# Patient Record
Sex: FEMALE | Race: WHITE | NOT HISPANIC OR LATINO
[De-identification: names, ages, dates, MRNs, and addresses within clinical notes are randomized per-mention and may not be internally consistent; named-entity substitution may affect disease eponyms.]

---

## 2017-02-07 ENCOUNTER — APPOINTMENT (OUTPATIENT)
Dept: SURGICAL ONCOLOGY | Facility: CLINIC | Age: 57
End: 2017-02-07

## 2017-02-07 VITALS
DIASTOLIC BLOOD PRESSURE: 79 MMHG | SYSTOLIC BLOOD PRESSURE: 126 MMHG | WEIGHT: 145.2 LBS | HEIGHT: 70 IN | OXYGEN SATURATION: 99 % | BODY MASS INDEX: 20.79 KG/M2 | HEART RATE: 70 BPM

## 2017-02-14 ENCOUNTER — RESULT REVIEW (OUTPATIENT)
Age: 57
End: 2017-02-14

## 2017-02-15 ENCOUNTER — FORM ENCOUNTER (OUTPATIENT)
Age: 57
End: 2017-02-15

## 2017-02-16 ENCOUNTER — APPOINTMENT (OUTPATIENT)
Dept: ULTRASOUND IMAGING | Facility: CLINIC | Age: 57
End: 2017-02-16

## 2017-02-16 ENCOUNTER — APPOINTMENT (OUTPATIENT)
Dept: MAMMOGRAPHY | Facility: CLINIC | Age: 57
End: 2017-02-16

## 2017-02-16 ENCOUNTER — OUTPATIENT (OUTPATIENT)
Dept: OUTPATIENT SERVICES | Facility: HOSPITAL | Age: 57
LOS: 1 days | End: 2017-02-16
Payer: COMMERCIAL

## 2017-02-16 DIAGNOSIS — Z00.8 ENCOUNTER FOR OTHER GENERAL EXAMINATION: ICD-10-CM

## 2017-02-16 PROCEDURE — 76641 ULTRASOUND BREAST COMPLETE: CPT

## 2017-02-16 PROCEDURE — 77063 BREAST TOMOSYNTHESIS BI: CPT

## 2017-02-16 PROCEDURE — 77067 SCR MAMMO BI INCL CAD: CPT

## 2017-05-03 ENCOUNTER — EMERGENCY (EMERGENCY)
Facility: HOSPITAL | Age: 57
LOS: 1 days | Discharge: HOME | End: 2017-05-03
Attending: EMERGENCY MEDICINE | Admitting: EMERGENCY MEDICINE
Payer: COMMERCIAL

## 2017-05-03 VITALS
DIASTOLIC BLOOD PRESSURE: 72 MMHG | SYSTOLIC BLOOD PRESSURE: 114 MMHG | OXYGEN SATURATION: 98 % | RESPIRATION RATE: 16 BRPM | HEART RATE: 72 BPM | TEMPERATURE: 98 F

## 2017-05-03 VITALS
RESPIRATION RATE: 16 BRPM | HEART RATE: 77 BPM | WEIGHT: 143.96 LBS | OXYGEN SATURATION: 97 % | SYSTOLIC BLOOD PRESSURE: 101 MMHG | HEIGHT: 70 IN | DIASTOLIC BLOOD PRESSURE: 67 MMHG

## 2017-05-03 DIAGNOSIS — Z90.49 ACQUIRED ABSENCE OF OTHER SPECIFIED PARTS OF DIGESTIVE TRACT: Chronic | ICD-10-CM

## 2017-05-03 LAB
ANION GAP SERPL CALC-SCNC: 6 MMOL/L — SIGNIFICANT CHANGE UP (ref 5–17)
BASOPHILS # BLD AUTO: 0.1 K/UL — SIGNIFICANT CHANGE UP (ref 0–0.2)
BASOPHILS NFR BLD AUTO: 1.2 % — SIGNIFICANT CHANGE UP (ref 0–2)
BUN SERPL-MCNC: 23 MG/DL — SIGNIFICANT CHANGE UP (ref 7–23)
CALCIUM SERPL-MCNC: 9.4 MG/DL — SIGNIFICANT CHANGE UP (ref 8.4–10.5)
CHLORIDE SERPL-SCNC: 108 MMOL/L — SIGNIFICANT CHANGE UP (ref 96–108)
CO2 SERPL-SCNC: 31 MMOL/L — SIGNIFICANT CHANGE UP (ref 22–31)
CREAT SERPL-MCNC: 0.68 MG/DL — SIGNIFICANT CHANGE UP (ref 0.5–1.3)
EOSINOPHIL # BLD AUTO: 0.1 K/UL — SIGNIFICANT CHANGE UP (ref 0–0.5)
EOSINOPHIL NFR BLD AUTO: 2.9 % — SIGNIFICANT CHANGE UP (ref 0–6)
GLUCOSE SERPL-MCNC: 102 MG/DL — HIGH (ref 70–99)
HCT VFR BLD CALC: 41.6 % — SIGNIFICANT CHANGE UP (ref 34.5–45)
HGB BLD-MCNC: 14.1 G/DL — SIGNIFICANT CHANGE UP (ref 11.5–15.5)
LYMPHOCYTES # BLD AUTO: 1.2 K/UL — SIGNIFICANT CHANGE UP (ref 1–3.3)
LYMPHOCYTES # BLD AUTO: 25.4 % — SIGNIFICANT CHANGE UP (ref 13–44)
MAGNESIUM SERPL-MCNC: 2.2 MG/DL — SIGNIFICANT CHANGE UP (ref 1.6–2.6)
MCHC RBC-ENTMCNC: 32.1 PG — SIGNIFICANT CHANGE UP (ref 27–34)
MCHC RBC-ENTMCNC: 33.8 GM/DL — SIGNIFICANT CHANGE UP (ref 32–36)
MCV RBC AUTO: 94.9 FL — SIGNIFICANT CHANGE UP (ref 80–100)
MONOCYTES # BLD AUTO: 0.4 K/UL — SIGNIFICANT CHANGE UP (ref 0–0.9)
MONOCYTES NFR BLD AUTO: 9.7 % — SIGNIFICANT CHANGE UP (ref 2–14)
NEUTROPHILS # BLD AUTO: 2.8 K/UL — SIGNIFICANT CHANGE UP (ref 1.8–7.4)
NEUTROPHILS NFR BLD AUTO: 60.8 % — SIGNIFICANT CHANGE UP (ref 43–77)
PLATELET # BLD AUTO: 279 K/UL — SIGNIFICANT CHANGE UP (ref 150–400)
POTASSIUM SERPL-MCNC: 4.3 MMOL/L — SIGNIFICANT CHANGE UP (ref 3.5–5.3)
POTASSIUM SERPL-SCNC: 4.3 MMOL/L — SIGNIFICANT CHANGE UP (ref 3.5–5.3)
RBC # BLD: 4.38 M/UL — SIGNIFICANT CHANGE UP (ref 3.8–5.2)
RBC # FLD: 14.2 % — SIGNIFICANT CHANGE UP (ref 10.3–14.5)
SODIUM SERPL-SCNC: 145 MMOL/L — SIGNIFICANT CHANGE UP (ref 135–145)
WBC # BLD: 4.6 K/UL — SIGNIFICANT CHANGE UP (ref 3.8–10.5)
WBC # FLD AUTO: 4.6 K/UL — SIGNIFICANT CHANGE UP (ref 3.8–10.5)

## 2017-05-03 PROCEDURE — 93005 ELECTROCARDIOGRAM TRACING: CPT

## 2017-05-03 PROCEDURE — 93010 ELECTROCARDIOGRAM REPORT: CPT

## 2017-05-03 PROCEDURE — 99284 EMERGENCY DEPT VISIT MOD MDM: CPT

## 2017-05-03 PROCEDURE — 85027 COMPLETE CBC AUTOMATED: CPT

## 2017-05-03 PROCEDURE — 99283 EMERGENCY DEPT VISIT LOW MDM: CPT | Mod: 25

## 2017-05-03 PROCEDURE — 80048 BASIC METABOLIC PNL TOTAL CA: CPT

## 2017-05-03 PROCEDURE — 83735 ASSAY OF MAGNESIUM: CPT

## 2017-05-03 RX ORDER — SODIUM CHLORIDE 9 MG/ML
1000 INJECTION INTRAMUSCULAR; INTRAVENOUS; SUBCUTANEOUS ONCE
Qty: 0 | Refills: 0 | Status: COMPLETED | OUTPATIENT
Start: 2017-05-03 | End: 2017-05-03

## 2017-05-03 RX ORDER — THYROID 120 MG
1 TABLET ORAL
Qty: 0 | Refills: 0 | COMMUNITY

## 2017-05-03 RX ADMIN — SODIUM CHLORIDE 1000 MILLILITER(S): 9 INJECTION INTRAMUSCULAR; INTRAVENOUS; SUBCUTANEOUS at 08:08

## 2017-05-03 NOTE — ED ADULT NURSE NOTE - CHPI ED SYMPTOMS NEG
no dizziness/no numbness/no fever/no tingling/no pain/no vomiting/no decreased eating/drinking/no chills/no weakness/no nausea

## 2017-05-03 NOTE — ED PROVIDER NOTE - OBJECTIVE STATEMENT
57 y.o. F says awoke about 30min ago, heart racing, ears hot - dropped a glass of water - went to bathroom had loose stool, no nausea - just felt like needed to lay down - felt dizzy like might fall - unsteady - now symptoms seem to be passing - ears feel warm - yesterday was fine - worked out at gym - not extremely strenous - good PO intake yesterday - has had rhinorrhea few weeks with change in season - wanted to come in and get checked out. Did smoke cigarettes x4 last night - is not a regular smoker - 4 drinks as well - drinks about 7glasses a week usually - not every day - was helping girlfriend deal with stress - pt denies h/o stress/depression 57 y.o. F says awoke about 30min ago, heart racing, ears hot - dropped a glass of water - went to bathroom had loose stool, no nausea - just felt like needed to lay down - felt dizzy like might fall - unsteady - now symptoms seem to be passing - ears feel warm - yesterday was fine - worked out at gym - not extremely strenous - good PO intake yesterday - has had rhinorrhea few weeks with change in season - wanted to come in and get checked out. Did smoke cigarettes x4 last night - is not a regular smoker - 4 drinks as well - drinks about 7glasses a week usually - not every day - was helping girlfriend deal with stress - pt denies h/o stress/depression, though did have stressful issues last week relating to long time friend

## 2017-05-03 NOTE — ED ADULT NURSE REASSESSMENT NOTE - NS ED NURSE REASSESS COMMENT FT1
Pt resting comfortably on stretcher. Family bedside. Color good. Skin warm & dry to touch. Lungs clear. IV patent & fusing well. Site in RT arm without signs of infiltrate.

## 2017-05-03 NOTE — ED PROVIDER NOTE - MEDICAL DECISION MAKING DETAILS
57 y.o. F presenting with acute episode unsteadiness/lightheadedness - now resolving - labs/ekg - re-eval

## 2017-08-02 ENCOUNTER — APPOINTMENT (OUTPATIENT)
Dept: ULTRASOUND IMAGING | Facility: CLINIC | Age: 57
End: 2017-08-02
Payer: COMMERCIAL

## 2017-08-02 ENCOUNTER — OUTPATIENT (OUTPATIENT)
Dept: OUTPATIENT SERVICES | Facility: HOSPITAL | Age: 57
LOS: 1 days | End: 2017-08-02
Payer: COMMERCIAL

## 2017-08-02 DIAGNOSIS — Z90.49 ACQUIRED ABSENCE OF OTHER SPECIFIED PARTS OF DIGESTIVE TRACT: Chronic | ICD-10-CM

## 2017-08-02 DIAGNOSIS — Z00.8 ENCOUNTER FOR OTHER GENERAL EXAMINATION: ICD-10-CM

## 2017-08-02 PROCEDURE — 76881 US COMPL JOINT R-T W/IMG: CPT | Mod: 26

## 2017-08-02 PROCEDURE — 76881 US COMPL JOINT R-T W/IMG: CPT

## 2018-02-08 ENCOUNTER — APPOINTMENT (OUTPATIENT)
Dept: SURGICAL ONCOLOGY | Facility: CLINIC | Age: 58
End: 2018-02-08
Payer: COMMERCIAL

## 2018-02-08 VITALS
DIASTOLIC BLOOD PRESSURE: 73 MMHG | HEART RATE: 63 BPM | SYSTOLIC BLOOD PRESSURE: 124 MMHG | WEIGHT: 145 LBS | BODY MASS INDEX: 20.76 KG/M2 | OXYGEN SATURATION: 98 % | HEIGHT: 70 IN

## 2018-02-08 PROCEDURE — 99214 OFFICE O/P EST MOD 30 MIN: CPT

## 2018-02-20 ENCOUNTER — FORM ENCOUNTER (OUTPATIENT)
Age: 58
End: 2018-02-20

## 2018-02-21 ENCOUNTER — APPOINTMENT (OUTPATIENT)
Dept: MAMMOGRAPHY | Facility: CLINIC | Age: 58
End: 2018-02-21
Payer: COMMERCIAL

## 2018-02-21 ENCOUNTER — APPOINTMENT (OUTPATIENT)
Dept: ULTRASOUND IMAGING | Facility: CLINIC | Age: 58
End: 2018-02-21
Payer: COMMERCIAL

## 2018-02-21 ENCOUNTER — OUTPATIENT (OUTPATIENT)
Dept: OUTPATIENT SERVICES | Facility: HOSPITAL | Age: 58
LOS: 1 days | End: 2018-02-21
Payer: COMMERCIAL

## 2018-02-21 DIAGNOSIS — Z00.8 ENCOUNTER FOR OTHER GENERAL EXAMINATION: ICD-10-CM

## 2018-02-21 DIAGNOSIS — Z90.49 ACQUIRED ABSENCE OF OTHER SPECIFIED PARTS OF DIGESTIVE TRACT: Chronic | ICD-10-CM

## 2018-02-21 PROCEDURE — 77067 SCR MAMMO BI INCL CAD: CPT | Mod: 26

## 2018-02-21 PROCEDURE — 77063 BREAST TOMOSYNTHESIS BI: CPT

## 2018-02-21 PROCEDURE — 76641 ULTRASOUND BREAST COMPLETE: CPT

## 2018-02-21 PROCEDURE — 77067 SCR MAMMO BI INCL CAD: CPT

## 2018-02-21 PROCEDURE — 76641 ULTRASOUND BREAST COMPLETE: CPT | Mod: 26,50

## 2018-02-21 PROCEDURE — 77063 BREAST TOMOSYNTHESIS BI: CPT | Mod: 26

## 2018-03-25 ENCOUNTER — RESULT REVIEW (OUTPATIENT)
Age: 58
End: 2018-03-25

## 2018-04-17 ENCOUNTER — APPOINTMENT (OUTPATIENT)
Dept: SURGERY | Facility: CLINIC | Age: 58
End: 2018-04-17
Payer: COMMERCIAL

## 2018-04-17 VITALS
HEART RATE: 67 BPM | DIASTOLIC BLOOD PRESSURE: 87 MMHG | RESPIRATION RATE: 15 BRPM | BODY MASS INDEX: 20.76 KG/M2 | TEMPERATURE: 97.7 F | WEIGHT: 145 LBS | HEIGHT: 70 IN | OXYGEN SATURATION: 100 % | SYSTOLIC BLOOD PRESSURE: 152 MMHG

## 2018-04-17 DIAGNOSIS — K62.89 OTHER SPECIFIED DISEASES OF ANUS AND RECTUM: ICD-10-CM

## 2018-04-17 PROCEDURE — 46221 LIGATION OF HEMORRHOID(S): CPT

## 2018-04-17 PROCEDURE — 99213 OFFICE O/P EST LOW 20 MIN: CPT | Mod: 25

## 2018-09-05 ENCOUNTER — EMERGENCY (EMERGENCY)
Facility: HOSPITAL | Age: 58
LOS: 1 days | Discharge: HOME | End: 2018-09-05
Attending: EMERGENCY MEDICINE
Payer: COMMERCIAL

## 2018-09-05 VITALS
HEART RATE: 68 BPM | TEMPERATURE: 98 F | DIASTOLIC BLOOD PRESSURE: 92 MMHG | WEIGHT: 145.06 LBS | OXYGEN SATURATION: 96 % | RESPIRATION RATE: 16 BRPM | HEIGHT: 70 IN | SYSTOLIC BLOOD PRESSURE: 147 MMHG

## 2018-09-05 VITALS
RESPIRATION RATE: 18 BRPM | OXYGEN SATURATION: 99 % | SYSTOLIC BLOOD PRESSURE: 145 MMHG | HEART RATE: 67 BPM | DIASTOLIC BLOOD PRESSURE: 84 MMHG

## 2018-09-05 DIAGNOSIS — Z90.49 ACQUIRED ABSENCE OF OTHER SPECIFIED PARTS OF DIGESTIVE TRACT: Chronic | ICD-10-CM

## 2018-09-05 PROBLEM — K57.20 DIVERTICULITIS OF LARGE INTESTINE WITH PERFORATION AND ABSCESS WITHOUT BLEEDING: Chronic | Status: ACTIVE | Noted: 2017-05-03

## 2018-09-05 LAB
ALBUMIN SERPL ELPH-MCNC: 4.5 G/DL — SIGNIFICANT CHANGE UP (ref 3.3–5)
ALP SERPL-CCNC: 78 U/L — SIGNIFICANT CHANGE UP (ref 40–120)
ALT FLD-CCNC: 16 U/L — SIGNIFICANT CHANGE UP (ref 10–45)
ANION GAP SERPL CALC-SCNC: 10 MMOL/L — SIGNIFICANT CHANGE UP (ref 5–17)
AST SERPL-CCNC: 18 U/L — SIGNIFICANT CHANGE UP (ref 10–40)
BASOPHILS # BLD AUTO: 0.1 K/UL — SIGNIFICANT CHANGE UP (ref 0–0.2)
BASOPHILS NFR BLD AUTO: 1.1 % — SIGNIFICANT CHANGE UP (ref 0–2)
BILIRUB SERPL-MCNC: 0.3 MG/DL — SIGNIFICANT CHANGE UP (ref 0.2–1.2)
BUN SERPL-MCNC: 13 MG/DL — SIGNIFICANT CHANGE UP (ref 7–23)
CALCIUM SERPL-MCNC: 9.9 MG/DL — SIGNIFICANT CHANGE UP (ref 8.4–10.5)
CHLORIDE SERPL-SCNC: 104 MMOL/L — SIGNIFICANT CHANGE UP (ref 96–108)
CO2 SERPL-SCNC: 26 MMOL/L — SIGNIFICANT CHANGE UP (ref 22–31)
CREAT SERPL-MCNC: 0.82 MG/DL — SIGNIFICANT CHANGE UP (ref 0.5–1.3)
EOSINOPHIL # BLD AUTO: 0.2 K/UL — SIGNIFICANT CHANGE UP (ref 0–0.5)
EOSINOPHIL NFR BLD AUTO: 3.1 % — SIGNIFICANT CHANGE UP (ref 0–6)
GLUCOSE SERPL-MCNC: 101 MG/DL — HIGH (ref 70–99)
HCT VFR BLD CALC: 41 % — SIGNIFICANT CHANGE UP (ref 34.5–45)
HGB BLD-MCNC: 13.6 G/DL — SIGNIFICANT CHANGE UP (ref 11.5–15.5)
LYMPHOCYTES # BLD AUTO: 1.7 K/UL — SIGNIFICANT CHANGE UP (ref 1–3.3)
LYMPHOCYTES # BLD AUTO: 28.8 % — SIGNIFICANT CHANGE UP (ref 13–44)
MCHC RBC-ENTMCNC: 30.6 PG — SIGNIFICANT CHANGE UP (ref 27–34)
MCHC RBC-ENTMCNC: 33.1 GM/DL — SIGNIFICANT CHANGE UP (ref 32–36)
MCV RBC AUTO: 92.5 FL — SIGNIFICANT CHANGE UP (ref 80–100)
MONOCYTES # BLD AUTO: 0.5 K/UL — SIGNIFICANT CHANGE UP (ref 0–0.9)
MONOCYTES NFR BLD AUTO: 8.3 % — SIGNIFICANT CHANGE UP (ref 2–14)
NEUTROPHILS # BLD AUTO: 3.4 K/UL — SIGNIFICANT CHANGE UP (ref 1.8–7.4)
NEUTROPHILS NFR BLD AUTO: 58.8 % — SIGNIFICANT CHANGE UP (ref 43–77)
PLATELET # BLD AUTO: 288 K/UL — SIGNIFICANT CHANGE UP (ref 150–400)
POTASSIUM SERPL-MCNC: 3.9 MMOL/L — SIGNIFICANT CHANGE UP (ref 3.5–5.3)
POTASSIUM SERPL-SCNC: 3.9 MMOL/L — SIGNIFICANT CHANGE UP (ref 3.5–5.3)
PROT SERPL-MCNC: 6.7 G/DL — SIGNIFICANT CHANGE UP (ref 6–8.3)
RBC # BLD: 4.43 M/UL — SIGNIFICANT CHANGE UP (ref 3.8–5.2)
RBC # FLD: 13 % — SIGNIFICANT CHANGE UP (ref 10.3–14.5)
SODIUM SERPL-SCNC: 140 MMOL/L — SIGNIFICANT CHANGE UP (ref 135–145)
TROPONIN T, HIGH SENSITIVITY RESULT: <6 NG/L — SIGNIFICANT CHANGE UP (ref 0–51)
WBC # BLD: 5.8 K/UL — SIGNIFICANT CHANGE UP (ref 3.8–10.5)
WBC # FLD AUTO: 5.8 K/UL — SIGNIFICANT CHANGE UP (ref 3.8–10.5)

## 2018-09-05 PROCEDURE — 93010 ELECTROCARDIOGRAM REPORT: CPT

## 2018-09-05 PROCEDURE — 93005 ELECTROCARDIOGRAM TRACING: CPT

## 2018-09-05 PROCEDURE — 84484 ASSAY OF TROPONIN QUANT: CPT

## 2018-09-05 PROCEDURE — 99284 EMERGENCY DEPT VISIT MOD MDM: CPT | Mod: 25

## 2018-09-05 PROCEDURE — 80053 COMPREHEN METABOLIC PANEL: CPT

## 2018-09-05 PROCEDURE — 99285 EMERGENCY DEPT VISIT HI MDM: CPT | Mod: 25

## 2018-09-05 PROCEDURE — 85027 COMPLETE CBC AUTOMATED: CPT

## 2018-09-05 PROCEDURE — 71045 X-RAY EXAM CHEST 1 VIEW: CPT

## 2018-09-05 PROCEDURE — 84443 ASSAY THYROID STIM HORMONE: CPT

## 2018-09-05 PROCEDURE — 99053 MED SERV 10PM-8AM 24 HR FAC: CPT

## 2018-09-05 PROCEDURE — 71045 X-RAY EXAM CHEST 1 VIEW: CPT | Mod: 26

## 2018-09-05 NOTE — ED POST DISCHARGE NOTE - DETAILS
9/5/18: Spoke with patient and made aware of elevated TSH. She has hx of hypothyroidism. Informed me she will f/u with her PMD regarding the results. - Felton Banks PA-C

## 2018-09-05 NOTE — ED ADULT NURSE NOTE - OBJECTIVE STATEMENT
57 y/o female presents to ED c/o chest discomfort that started tonight at rest. She reports taking checking her HR which was 53. Denies chest pain, SOB, palpitations. She says discomfort feels like a "mild pressure" with no radiation or diaphoresis. Pt appears anxious. NSR on cardiac monitor. Gross motor and neuro intact. 20G IV placed in RAC. Safety and comfort provided.

## 2018-09-05 NOTE — ED PROVIDER NOTE - MEDICAL DECISION MAKING DETAILS
EKG, CXR, labs including troponin EKG, CXR, labs including troponin; Not suspecting PE clinically based on vitals and history and physical exam.

## 2018-09-05 NOTE — ED ADULT NURSE NOTE - CHPI ED NUR SYMPTOMS NEG
no shortness of breath/no back pain/no chills/no dizziness/no chest pain/no fever/no nausea/no syncope/no diaphoresis/no congestion/no vomiting

## 2018-09-05 NOTE — ED PROVIDER NOTE - OBJECTIVE STATEMENT
59 yo female with PMH of hypothyroidism 57 yo female with PMH of hypothyroidism presents to the ED for chest discomfort. Pt states she was watching TV tonight when she developed vague chest discomfort that was mid-sternal, non-radiating, no SOB. Patient appears NAD in the ED. No aspirin tonight, not on AC. Denies extremity pain or swelling, prolonged immobility, recent surgery, h/o PE or DVT. Non-smoker. 59 yo female with PMH of hypothyroidism presents to the ED for chest discomfort. Pt states she was watching TV tonight when she developed vague chest discomfort that was mid-sternal, non-radiating, no SOB. Patient appears NAD in the ED. No aspirin tonight, not on AC. Takes hormone therapy for menopause. Denies extremity pain or swelling, prolonged immobility, recent surgery, h/o PE or DVT. Intermittent social smoker.

## 2018-09-05 NOTE — ED PROVIDER NOTE - ATTENDING CONTRIBUTION TO CARE
attending Katelyn: 58yF h/o hypothyroidism, everyday exercise at gym presents after episode of chest discomfort while watching TV. Mid-sternal, non-radiating, no SOB. No active CP in ED. Intermittent social tobacco. Will obtain ekg, place on tele, labs including troponin, cxr and reassess

## 2019-02-11 ENCOUNTER — APPOINTMENT (OUTPATIENT)
Dept: SURGICAL ONCOLOGY | Facility: CLINIC | Age: 59
End: 2019-02-11

## 2019-03-05 ENCOUNTER — APPOINTMENT (OUTPATIENT)
Dept: SURGERY | Facility: CLINIC | Age: 59
End: 2019-03-05
Payer: COMMERCIAL

## 2019-03-05 VITALS
OXYGEN SATURATION: 98 % | DIASTOLIC BLOOD PRESSURE: 84 MMHG | HEIGHT: 70 IN | SYSTOLIC BLOOD PRESSURE: 154 MMHG | WEIGHT: 142 LBS | HEART RATE: 78 BPM | TEMPERATURE: 97.5 F | BODY MASS INDEX: 20.33 KG/M2 | RESPIRATION RATE: 16 BRPM

## 2019-03-05 RX ORDER — LEVOTHYROXINE SODIUM 0.1 MG/1
100 TABLET ORAL
Qty: 30 | Refills: 0 | Status: ACTIVE | COMMUNITY
Start: 2019-02-25

## 2019-03-05 RX ORDER — HYDROCORTISONE ACETATE 25 MG/1
25 SUPPOSITORY RECTAL
Qty: 12 | Refills: 0 | Status: ACTIVE | COMMUNITY
Start: 2019-02-26

## 2019-03-05 RX ORDER — LEVOTHYROXINE, LIOTHYRONINE 57; 13.5 UG/1; UG/1
90 TABLET ORAL
Qty: 30 | Refills: 0 | Status: DISCONTINUED | COMMUNITY
Start: 2018-11-14

## 2019-03-05 RX ORDER — FLUTICASONE PROPIONATE AND SALMETEROL 50; 250 UG/1; UG/1
250-50 POWDER RESPIRATORY (INHALATION)
Qty: 60 | Refills: 0 | Status: DISCONTINUED | COMMUNITY
Start: 2017-11-07 | End: 2019-03-05

## 2019-03-06 ENCOUNTER — TRANSCRIPTION ENCOUNTER (OUTPATIENT)
Age: 59
End: 2019-03-06

## 2019-03-07 ENCOUNTER — APPOINTMENT (OUTPATIENT)
Dept: SURGICAL ONCOLOGY | Facility: CLINIC | Age: 59
End: 2019-03-07
Payer: COMMERCIAL

## 2019-03-07 VITALS
OXYGEN SATURATION: 100 % | DIASTOLIC BLOOD PRESSURE: 77 MMHG | BODY MASS INDEX: 20.04 KG/M2 | HEIGHT: 70 IN | HEART RATE: 72 BPM | SYSTOLIC BLOOD PRESSURE: 134 MMHG | WEIGHT: 140 LBS

## 2019-03-07 PROCEDURE — 99214 OFFICE O/P EST MOD 30 MIN: CPT

## 2019-03-08 NOTE — ASSESSMENT
[FreeTextEntry1] : 59 year old woman here for annual breast exam with previous BIRADS2 imaging. Her breasts are rather dense, however I do not palpate any concerning nodules and her imaging has been consistent. I ordered her for a mammo-sono in the coming weeks to compare to priors. If no concerning findings arise I believe she can continue with this regimen, and told her we can obtain an MRI if her imaging does not demonstrate consistent findings.

## 2019-03-08 NOTE — PHYSICAL EXAM
[Normal] : supple, no neck mass and thyroid not enlarged [Normal Neck Lymph Nodes] : normal neck lymph nodes  [Normal Supraclavicular Lymph Nodes] : normal supraclavicular lymph nodes [Normal Groin Lymph Nodes] : normal groin lymph nodes [Normal Axillary Lymph Nodes] : normal axillary lymph nodes [Normal] : oriented to person, place and time, with appropriate affect [de-identified] : the breasts are symmetric. There are no areas of skin thickening or tethering, nipple inversion/scaling/discharge, or dominant masses in either breast. Both breasts are characterized by very dense fibrous tissue quality

## 2019-03-08 NOTE — HISTORY OF PRESENT ILLNESS
[de-identified] : Ms. Friedman is a 59 year old healthy woman who is formerly a patient of Dr. Lezama. She comes for yearly breast imaging and clinical exam. Her last mammo-sono was done in Feb, 2018 and demonstrated BIRADS2 findings. She denies any new breast lesions, nipple changes, or changes in the skin or contour of her breasts. She has always been told she has very dense breasts and may need an MRI, and presents to discuss her best surveillance options going forward.

## 2019-03-26 ENCOUNTER — APPOINTMENT (OUTPATIENT)
Dept: ULTRASOUND IMAGING | Facility: CLINIC | Age: 59
End: 2019-03-26
Payer: COMMERCIAL

## 2019-03-26 ENCOUNTER — OUTPATIENT (OUTPATIENT)
Dept: OUTPATIENT SERVICES | Facility: HOSPITAL | Age: 59
LOS: 1 days | End: 2019-03-26
Payer: COMMERCIAL

## 2019-03-26 ENCOUNTER — APPOINTMENT (OUTPATIENT)
Dept: MAMMOGRAPHY | Facility: CLINIC | Age: 59
End: 2019-03-26
Payer: COMMERCIAL

## 2019-03-26 DIAGNOSIS — Z00.8 ENCOUNTER FOR OTHER GENERAL EXAMINATION: ICD-10-CM

## 2019-03-26 DIAGNOSIS — Z90.49 ACQUIRED ABSENCE OF OTHER SPECIFIED PARTS OF DIGESTIVE TRACT: Chronic | ICD-10-CM

## 2019-03-26 PROCEDURE — 76641 ULTRASOUND BREAST COMPLETE: CPT

## 2019-03-26 PROCEDURE — 77067 SCR MAMMO BI INCL CAD: CPT

## 2019-03-26 PROCEDURE — 77063 BREAST TOMOSYNTHESIS BI: CPT | Mod: 26

## 2019-03-26 PROCEDURE — 77067 SCR MAMMO BI INCL CAD: CPT | Mod: 26

## 2019-03-26 PROCEDURE — 76641 ULTRASOUND BREAST COMPLETE: CPT | Mod: 26,50

## 2019-03-26 PROCEDURE — 77063 BREAST TOMOSYNTHESIS BI: CPT

## 2019-03-28 DIAGNOSIS — K64.9 UNSPECIFIED HEMORRHOIDS: ICD-10-CM

## 2019-03-28 PROCEDURE — 46600 DIAGNOSTIC ANOSCOPY SPX: CPT

## 2019-03-28 PROCEDURE — 99213 OFFICE O/P EST LOW 20 MIN: CPT | Mod: 25

## 2019-09-17 ENCOUNTER — TRANSCRIPTION ENCOUNTER (OUTPATIENT)
Age: 59
End: 2019-09-17

## 2020-03-12 ENCOUNTER — APPOINTMENT (OUTPATIENT)
Dept: SURGICAL ONCOLOGY | Facility: CLINIC | Age: 60
End: 2020-03-12
Payer: COMMERCIAL

## 2020-03-12 VITALS
HEIGHT: 70 IN | DIASTOLIC BLOOD PRESSURE: 86 MMHG | BODY MASS INDEX: 20.41 KG/M2 | OXYGEN SATURATION: 97 % | WEIGHT: 142.6 LBS | TEMPERATURE: 98.2 F | HEART RATE: 62 BPM | SYSTOLIC BLOOD PRESSURE: 146 MMHG | RESPIRATION RATE: 16 BRPM

## 2020-03-12 PROCEDURE — 99214 OFFICE O/P EST MOD 30 MIN: CPT

## 2020-03-18 NOTE — PHYSICAL EXAM
[Normal] : supple, no neck mass and thyroid not enlarged [Normal Neck Lymph Nodes] : normal neck lymph nodes  [Normal Supraclavicular Lymph Nodes] : normal supraclavicular lymph nodes [Normal Groin Lymph Nodes] : normal groin lymph nodes [Normal Axillary Lymph Nodes] : normal axillary lymph nodes [Normal] : oriented to person, place and time, with appropriate affect [de-identified] : 1.5 CM round mobile firm tender nodule on left upper inner quadrant breast - 11 o'clock 3.0 CM from the nipple

## 2020-03-18 NOTE — ASSESSMENT
[FreeTextEntry1] : 60 year old woman here for annual breast exam with previous BIRADS2 imaging. There is a 1.5 CM round mobile firm tender nodule on left upper inner quadrant breast - 11 o'clock 3.0 CM from the nipple. I ordered her for a mammo-sono for now and 1 year follow up if no concerning imaging findings found. If no concerning findings arise I believe she can continue with this regimen.\par \par \par Plan:\par - Mammo/sono now - will recommend evening primrose oil if exam is unremarkable\par - 1 year follow up if no concerning imaging found, earlier if mammo/sono with concerning findings.

## 2020-03-18 NOTE — HISTORY OF PRESENT ILLNESS
[de-identified] : Ms Daiana Friedman is a 61 y/o female presents for follow-up visit for breast evaluation. Pt reports having left breast pain. Denies nipple discharge, changes in skin or contour. States she has been on bioidentical hormones since 2010 but denies starting new medications or hormones. \par \par US breast complete BL - 3/26/2019 - impression: no suspicious mass, suspicious microcalcifications, or other sign of malignancy is identified. There is no underlying diffuse marked vague nodularity as in the past...scattered benign type calcifications are noted\par MG Mammo Screen w JOSE BI# - 3/26/2019 - impression: no mammographic evidence of malignancy\par \par INTERIM: Ms. Friedman is a 59 year old healthy woman who is formerly a patient of Dr. Lezama. She comes for yearly breast imaging and clinical exam. Her last mammo-sono was done in Feb, 2018 and demonstrated BIRADS2 findings. She denies any new breast lesions, nipple changes, or changes in the skin or contour of her breasts. She has always been told she has very dense breasts and may need an MRI, and presents to discuss her best surveillance options going forward.

## 2020-05-12 ENCOUNTER — RESULT REVIEW (OUTPATIENT)
Age: 60
End: 2020-05-12

## 2020-05-12 ENCOUNTER — APPOINTMENT (OUTPATIENT)
Dept: ULTRASOUND IMAGING | Facility: CLINIC | Age: 60
End: 2020-05-12
Payer: COMMERCIAL

## 2020-05-12 ENCOUNTER — OUTPATIENT (OUTPATIENT)
Dept: OUTPATIENT SERVICES | Facility: HOSPITAL | Age: 60
LOS: 1 days | End: 2020-05-12
Payer: COMMERCIAL

## 2020-05-12 ENCOUNTER — APPOINTMENT (OUTPATIENT)
Dept: MAMMOGRAPHY | Facility: CLINIC | Age: 60
End: 2020-05-12
Payer: COMMERCIAL

## 2020-05-12 DIAGNOSIS — Z90.49 ACQUIRED ABSENCE OF OTHER SPECIFIED PARTS OF DIGESTIVE TRACT: Chronic | ICD-10-CM

## 2020-05-12 DIAGNOSIS — Z12.31 ENCOUNTER FOR SCREENING MAMMOGRAM FOR MALIGNANT NEOPLASM OF BREAST: ICD-10-CM

## 2020-05-12 PROCEDURE — 77066 DX MAMMO INCL CAD BI: CPT

## 2020-05-12 PROCEDURE — 76641 ULTRASOUND BREAST COMPLETE: CPT

## 2020-05-12 PROCEDURE — G0279: CPT | Mod: 26

## 2020-05-12 PROCEDURE — 76641 ULTRASOUND BREAST COMPLETE: CPT | Mod: 26,50

## 2020-05-12 PROCEDURE — 77066 DX MAMMO INCL CAD BI: CPT | Mod: 26

## 2020-05-12 PROCEDURE — G0279: CPT

## 2020-06-01 NOTE — ED ADULT NURSE NOTE - NSFALLRSKASSESSTYPE_ED_ALL_ED
~1st attempt: Called pt, no answer, left vm and asked to call back to resched appt as PCP will be out of the office.      Initial (On Arrival)

## 2020-12-31 ENCOUNTER — EMERGENCY (EMERGENCY)
Facility: HOSPITAL | Age: 60
LOS: 0 days | Discharge: HOME | End: 2020-12-31
Attending: EMERGENCY MEDICINE
Payer: COMMERCIAL

## 2020-12-31 VITALS
DIASTOLIC BLOOD PRESSURE: 65 MMHG | SYSTOLIC BLOOD PRESSURE: 136 MMHG | TEMPERATURE: 98 F | OXYGEN SATURATION: 100 % | RESPIRATION RATE: 18 BRPM | HEART RATE: 65 BPM

## 2020-12-31 VITALS — WEIGHT: 143.08 LBS | HEIGHT: 70 IN

## 2020-12-31 DIAGNOSIS — E03.9 HYPOTHYROIDISM, UNSPECIFIED: ICD-10-CM

## 2020-12-31 DIAGNOSIS — Z90.49 ACQUIRED ABSENCE OF OTHER SPECIFIED PARTS OF DIGESTIVE TRACT: Chronic | ICD-10-CM

## 2020-12-31 DIAGNOSIS — R07.89 OTHER CHEST PAIN: ICD-10-CM

## 2020-12-31 DIAGNOSIS — R00.2 PALPITATIONS: ICD-10-CM

## 2020-12-31 DIAGNOSIS — Z85.89 PERSONAL HISTORY OF MALIGNANT NEOPLASM OF OTHER ORGANS AND SYSTEMS: ICD-10-CM

## 2020-12-31 LAB
ALBUMIN SERPL ELPH-MCNC: 3.5 G/DL — SIGNIFICANT CHANGE UP (ref 3.3–5)
ALP SERPL-CCNC: 80 U/L — SIGNIFICANT CHANGE UP (ref 40–120)
ALT FLD-CCNC: 21 U/L — SIGNIFICANT CHANGE UP (ref 12–78)
ANION GAP SERPL CALC-SCNC: 2 MMOL/L — LOW (ref 5–17)
AST SERPL-CCNC: 12 U/L — LOW (ref 15–37)
BASOPHILS # BLD AUTO: 0.04 K/UL — SIGNIFICANT CHANGE UP (ref 0–0.2)
BASOPHILS NFR BLD AUTO: 0.8 % — SIGNIFICANT CHANGE UP (ref 0–2)
BILIRUB SERPL-MCNC: 0.4 MG/DL — SIGNIFICANT CHANGE UP (ref 0.2–1.2)
BUN SERPL-MCNC: 19 MG/DL — SIGNIFICANT CHANGE UP (ref 7–23)
CALCIUM SERPL-MCNC: 9.1 MG/DL — SIGNIFICANT CHANGE UP (ref 8.5–10.1)
CHLORIDE SERPL-SCNC: 109 MMOL/L — HIGH (ref 96–108)
CO2 SERPL-SCNC: 28 MMOL/L — SIGNIFICANT CHANGE UP (ref 22–31)
CREAT SERPL-MCNC: 0.62 MG/DL — SIGNIFICANT CHANGE UP (ref 0.5–1.3)
EOSINOPHIL # BLD AUTO: 0.18 K/UL — SIGNIFICANT CHANGE UP (ref 0–0.5)
EOSINOPHIL NFR BLD AUTO: 3.5 % — SIGNIFICANT CHANGE UP (ref 0–6)
GLUCOSE SERPL-MCNC: 91 MG/DL — SIGNIFICANT CHANGE UP (ref 70–99)
HCT VFR BLD CALC: 40.7 % — SIGNIFICANT CHANGE UP (ref 34.5–45)
HGB BLD-MCNC: 13.1 G/DL — SIGNIFICANT CHANGE UP (ref 11.5–15.5)
IMM GRANULOCYTES NFR BLD AUTO: 0.2 % — SIGNIFICANT CHANGE UP (ref 0–1.5)
LYMPHOCYTES # BLD AUTO: 1.57 K/UL — SIGNIFICANT CHANGE UP (ref 1–3.3)
LYMPHOCYTES # BLD AUTO: 30.3 % — SIGNIFICANT CHANGE UP (ref 13–44)
MCHC RBC-ENTMCNC: 30.3 PG — SIGNIFICANT CHANGE UP (ref 27–34)
MCHC RBC-ENTMCNC: 32.2 GM/DL — SIGNIFICANT CHANGE UP (ref 32–36)
MCV RBC AUTO: 94.2 FL — SIGNIFICANT CHANGE UP (ref 80–100)
MONOCYTES # BLD AUTO: 0.56 K/UL — SIGNIFICANT CHANGE UP (ref 0–0.9)
MONOCYTES NFR BLD AUTO: 10.8 % — SIGNIFICANT CHANGE UP (ref 2–14)
NEUTROPHILS # BLD AUTO: 2.82 K/UL — SIGNIFICANT CHANGE UP (ref 1.8–7.4)
NEUTROPHILS NFR BLD AUTO: 54.4 % — SIGNIFICANT CHANGE UP (ref 43–77)
PLATELET # BLD AUTO: 257 K/UL — SIGNIFICANT CHANGE UP (ref 150–400)
POTASSIUM SERPL-MCNC: 3.8 MMOL/L — SIGNIFICANT CHANGE UP (ref 3.5–5.3)
POTASSIUM SERPL-SCNC: 3.8 MMOL/L — SIGNIFICANT CHANGE UP (ref 3.5–5.3)
PROT SERPL-MCNC: 6.8 GM/DL — SIGNIFICANT CHANGE UP (ref 6–8.3)
RBC # BLD: 4.32 M/UL — SIGNIFICANT CHANGE UP (ref 3.8–5.2)
RBC # FLD: 13.6 % — SIGNIFICANT CHANGE UP (ref 10.3–14.5)
SODIUM SERPL-SCNC: 139 MMOL/L — SIGNIFICANT CHANGE UP (ref 135–145)
TROPONIN I SERPL-MCNC: <0.015 NG/ML — SIGNIFICANT CHANGE UP (ref 0.01–0.04)
WBC # BLD: 5.18 K/UL — SIGNIFICANT CHANGE UP (ref 3.8–10.5)
WBC # FLD AUTO: 5.18 K/UL — SIGNIFICANT CHANGE UP (ref 3.8–10.5)

## 2020-12-31 PROCEDURE — 84484 ASSAY OF TROPONIN QUANT: CPT

## 2020-12-31 PROCEDURE — 99284 EMERGENCY DEPT VISIT MOD MDM: CPT

## 2020-12-31 PROCEDURE — 71046 X-RAY EXAM CHEST 2 VIEWS: CPT

## 2020-12-31 PROCEDURE — 80053 COMPREHEN METABOLIC PANEL: CPT

## 2020-12-31 PROCEDURE — 93010 ELECTROCARDIOGRAM REPORT: CPT

## 2020-12-31 PROCEDURE — 71046 X-RAY EXAM CHEST 2 VIEWS: CPT | Mod: 26

## 2020-12-31 PROCEDURE — 36415 COLL VENOUS BLD VENIPUNCTURE: CPT

## 2020-12-31 PROCEDURE — 93005 ELECTROCARDIOGRAM TRACING: CPT

## 2020-12-31 PROCEDURE — 85025 COMPLETE CBC W/AUTO DIFF WBC: CPT

## 2020-12-31 PROCEDURE — 99285 EMERGENCY DEPT VISIT HI MDM: CPT

## 2020-12-31 NOTE — ED STATDOCS - EYES, MLM
Pt nonverbal  Emergency contact listed as pt's brother Mere Pearson--CM unable to locate contact information for pt's brother via web search at this time to complete CM assessment  clear bilaterally.  Pupils equal, round, and reactive to light.

## 2020-12-31 NOTE — ED STATDOCS - NSFOLLOWUPINSTRUCTIONS_ED_ALL_ED_FT
Heart Palpitations    WHAT YOU NEED TO KNOW:    Heart palpitations are feelings that your heart races, jumps, throbs, or flutters. You may feel extra beats, no beats for a short time, or skipped beats. You may have these feelings in your chest, throat, or neck. They may happen when you are sitting, standing, or lying. Heart palpitations may be frightening, but are usually not caused by a serious problem.     DISCHARGE INSTRUCTIONS:    Call 911 or have someone else call for any of the following:     You have any of the following signs of a heart attack:   Squeezing, pressure, or pain in your chest       and any of the following:   Discomfort or pain in your back, neck, jaw, stomach, or arm       Shortness of breath      Nausea or vomiting      Lightheadedness or a sudden cold sweat      You have any of the following signs of a stroke:   Numbness or drooping on one side of your face       Weakness in an arm or leg      Confusion or difficulty speaking      Dizziness, a severe headache, or vision loss      You faint or lose consciousness.     Return to the emergency department if:     Your palpitations happen more often or get more intense.         Contact your healthcare provider if:     You have new or worsening swelling in your feet or ankles.      You have questions or concerns about your condition or care.    Follow up with your healthcare provider as directed: You may need to follow up with a cardiologist. You may need tests to check for heart problems that cause palpitations. Write down your questions so you remember to ask them during your visits.     Keep a record: Write down when your palpitations start and stop, what you were doing when they started, and your symptoms. Keep track of what you ate or drank within a few hours of your palpitations. Include anything that seemed to help your symptoms, such as lying down or holding your breath. This record will help you and your healthcare provider learn what triggers your palpitations. Bring this record with you to your follow up visits.    Help prevent heart palpitations:     Manage stress and anxiety. Find ways to relax such as listening to music, meditating, or doing yoga. Exercise can also help decrease stress and anxiety. Talk to someone you trust about your stress or anxiety. You can also talk to a therapist.       Get plenty of sleep every night. Ask your healthcare provider how much sleep you need each night.       Do not drink caffeine or alcohol. Caffeine and alcohol can make your palpitations worse. Caffeine is found in soda, coffee, tea, chocolate, and drinks that increase your energy.       Do not smoke. Nicotine and other chemicals in cigarettes and cigars may damage your heart and blood vessels. Ask your healthcare provider for information if you currently smoke and need help to quit. E-cigarettes or smokeless tobacco still contain nicotine. Talk to your healthcare provider before you use these products.       Do not use illegal drugs. Talk to your healthcare provider if you use illegal drugs and want help to quit.     FOLLOW UP WITH YOUR CARDIOLOGIST IN 1-2 DAYS. RETURN TO THE ER FOR ANY WORSENING SYMPTOMS OR NEW CONCERNS.

## 2020-12-31 NOTE — ED STATDOCS - ATTENDING CONTRIBUTION TO CARE
I,Shilo Kohli MD,  performed the initial face to face bedside interview with this patient regarding history of present illness, review of symptoms and relevant past medical, social and family history.  I completed an independent physical examination.  I was the initial provider who evaluated this patient. I have signed out the follow up of any pending tests (i.e. labs, radiological studies) to the ACP.  I have communicated the patient’s plan of care and disposition with the ACP.  The history, relevant review of systems, past medical and surgical history, medical decision making, and physical examination was documented by the scribe in my presence and I attest to the accuracy of the documentation.

## 2020-12-31 NOTE — ED STATDOCS - PMH
Basal cell cancer    Diverticulitis of large intestine with abscess without bleeding    Hypothyroid

## 2020-12-31 NOTE — ED STATDOCS - CLINICAL SUMMARY MEDICAL DECISION MAKING FREE TEXT BOX
50 F with hx of hypothyroidism presents with tachycardia after EtOH use with chest tightness. Patient in NAD, Well appearing. Patient with normal EKG, will check trop, dimer and f/u with cardiologist if normal.

## 2020-12-31 NOTE — ED STATDOCS - PROGRESS NOTE DETAILS
signed Maryse Dickson PA-C Pt seen initially in intake by Dr Kohli.   60F c/o chest discomfort and palpitations since about 1am. Pt notes she had some drinks while out at dinner last night. Pt alert, NAD. No significant findings on labs, imaging, or EKG. PMH hypothyroid. Card Valente Henriquez. Recommend pt not consume alcohol or caffeine. outpt f/u card. return precautions given. Pt feeling well at DC, agrees with DC and plan of care.

## 2020-12-31 NOTE — ED STATDOCS - PATIENT PORTAL LINK FT
You can access the FollowMyHealth Patient Portal offered by [facility] by registering at the following website: [email or web address]. By joining [email or web address]’s FollowMyHealth portal, you will also be able to view your health information using other applications (apps) compatible with our system.

## 2020-12-31 NOTE — ED STATDOCS - OBJECTIVE STATEMENT
61 y/o F with PMHx of basal cell cancer, diverticulitis, mitral valve prolapse, hypothyroidism presenting to the ED c/o constant chest discomfort starting this AM. Describes pain as tightness, non radiating. Patient reports that she was out for dinner last night, had two drinks, woke up this AM with tachycardia followed by CP. Patient went to UC today, told patient to come to the ED for further evaluation. Nothing makes the pain better or worse. Denies any recent sick contacts, dizziness, N/V/D, fever or chills. Non smoker   +family hx of heart problems   Cardio: Dr. Henriquez

## 2020-12-31 NOTE — ED ADULT TRIAGE NOTE - CHIEF COMPLAINT QUOTE
c/o chest tightness started today, denies radiation, denies fever, URI symptoms, denies sick contact HX: valve prolapse

## 2021-03-11 ENCOUNTER — APPOINTMENT (OUTPATIENT)
Dept: SURGICAL ONCOLOGY | Facility: CLINIC | Age: 61
End: 2021-03-11

## 2021-03-25 ENCOUNTER — APPOINTMENT (OUTPATIENT)
Dept: SURGICAL ONCOLOGY | Facility: CLINIC | Age: 61
End: 2021-03-25
Payer: COMMERCIAL

## 2021-03-25 VITALS
DIASTOLIC BLOOD PRESSURE: 84 MMHG | BODY MASS INDEX: 20.33 KG/M2 | TEMPERATURE: 97.2 F | HEART RATE: 67 BPM | WEIGHT: 142 LBS | HEIGHT: 70 IN | SYSTOLIC BLOOD PRESSURE: 145 MMHG | OXYGEN SATURATION: 99 %

## 2021-03-25 PROCEDURE — 99215 OFFICE O/P EST HI 40 MIN: CPT

## 2021-03-25 PROCEDURE — 99072 ADDL SUPL MATRL&STAF TM PHE: CPT

## 2021-03-25 NOTE — ASSESSMENT
[FreeTextEntry1] : 61 year old woman with known fibrocystic breast, coming for yearly breast exam and imaging. Now with new benign-appearing nodule in the left breast on my exam at 9:00. I reassured her that I felt it was benign but anyway she is do for breast mammo/sono bilaterally now. Will send her for imaging and either close clinical follow up or biopsy as indicated from imaging.

## 2021-03-25 NOTE — PHYSICAL EXAM
[Normal] : supple, no neck mass and thyroid not enlarged [Normal Neck Lymph Nodes] : normal neck lymph nodes  [Normal Supraclavicular Lymph Nodes] : normal supraclavicular lymph nodes [Normal Groin Lymph Nodes] : normal groin lymph nodes [Normal Axillary Lymph Nodes] : normal axillary lymph nodes [Normal] : oriented to person, place and time, with appropriate affect [de-identified] : the breasts are symmetric. There are no areas of skin thickening or tethering, nipple inversion/scaling/discharge. No new findings in the right breast. In the left breast at the 9:00 position, 3cm from nipple is a new 1cm ovoid, compressible, well-defined, soft, mobile nodule.

## 2021-03-25 NOTE — HISTORY OF PRESENT ILLNESS
[de-identified] : Ms. Friedman is a 59 year old healthy woman who is formerly a patient of Dr. Lezama. She comes for yearly breast imaging and clinical exam. Her last mammo-sono was done in Feb, 2018 and demonstrated BIRADS2 findings. She denies any new breast lesions, nipple changes, or changes in the skin or contour of her breasts. She has always been told she has very dense breasts and may need an MRI, and presents to discuss her best surveillance options going forward.\par \par INTERIM 3/25/21: Ms. Friedman returns for yearly follow up clinical breast exam. She denies changes in her breasts during the last year. She has not noted any nipple inversion or discharge, or breast masses or skin changes. Her last imaging was performed in May, 2020 that demonstrated a new BIRADS 3 finding of a likely benign nodule in the left breast at 10:00, measuring 6mm.

## 2021-04-07 ENCOUNTER — APPOINTMENT (OUTPATIENT)
Dept: ULTRASOUND IMAGING | Facility: CLINIC | Age: 61
End: 2021-04-07
Payer: COMMERCIAL

## 2021-04-07 ENCOUNTER — OUTPATIENT (OUTPATIENT)
Dept: OUTPATIENT SERVICES | Facility: HOSPITAL | Age: 61
LOS: 1 days | End: 2021-04-07
Payer: COMMERCIAL

## 2021-04-07 ENCOUNTER — RESULT REVIEW (OUTPATIENT)
Age: 61
End: 2021-04-07

## 2021-04-07 ENCOUNTER — APPOINTMENT (OUTPATIENT)
Dept: MAMMOGRAPHY | Facility: CLINIC | Age: 61
End: 2021-04-07
Payer: COMMERCIAL

## 2021-04-07 DIAGNOSIS — Z90.49 ACQUIRED ABSENCE OF OTHER SPECIFIED PARTS OF DIGESTIVE TRACT: Chronic | ICD-10-CM

## 2021-04-07 DIAGNOSIS — Z00.8 ENCOUNTER FOR OTHER GENERAL EXAMINATION: ICD-10-CM

## 2021-04-07 PROCEDURE — 77066 DX MAMMO INCL CAD BI: CPT | Mod: 26

## 2021-04-07 PROCEDURE — G0279: CPT

## 2021-04-07 PROCEDURE — 76641 ULTRASOUND BREAST COMPLETE: CPT

## 2021-04-07 PROCEDURE — 76641 ULTRASOUND BREAST COMPLETE: CPT | Mod: 26,50

## 2021-04-07 PROCEDURE — G0279: CPT | Mod: 26

## 2021-04-07 PROCEDURE — 77066 DX MAMMO INCL CAD BI: CPT

## 2021-04-29 ENCOUNTER — APPOINTMENT (OUTPATIENT)
Dept: RADIOLOGY | Facility: CLINIC | Age: 61
End: 2021-04-29
Payer: COMMERCIAL

## 2021-04-29 ENCOUNTER — OUTPATIENT (OUTPATIENT)
Dept: OUTPATIENT SERVICES | Facility: HOSPITAL | Age: 61
LOS: 1 days | End: 2021-04-29
Payer: COMMERCIAL

## 2021-04-29 ENCOUNTER — APPOINTMENT (OUTPATIENT)
Dept: SURGICAL ONCOLOGY | Facility: CLINIC | Age: 61
End: 2021-04-29
Payer: COMMERCIAL

## 2021-04-29 VITALS
TEMPERATURE: 97.7 F | HEART RATE: 71 BPM | OXYGEN SATURATION: 98 % | WEIGHT: 142 LBS | HEIGHT: 70 IN | SYSTOLIC BLOOD PRESSURE: 144 MMHG | BODY MASS INDEX: 20.33 KG/M2 | DIASTOLIC BLOOD PRESSURE: 77 MMHG

## 2021-04-29 DIAGNOSIS — R92.8 OTHER ABNORMAL AND INCONCLUSIVE FINDINGS ON DIAGNOSTIC IMAGING OF BREAST: ICD-10-CM

## 2021-04-29 DIAGNOSIS — Z90.49 ACQUIRED ABSENCE OF OTHER SPECIFIED PARTS OF DIGESTIVE TRACT: Chronic | ICD-10-CM

## 2021-04-29 DIAGNOSIS — Z13.820 ENCOUNTER FOR SCREENING FOR OSTEOPOROSIS: ICD-10-CM

## 2021-04-29 PROCEDURE — 77080 DXA BONE DENSITY AXIAL: CPT | Mod: 26

## 2021-04-29 PROCEDURE — 99072 ADDL SUPL MATRL&STAF TM PHE: CPT

## 2021-04-29 PROCEDURE — 99243 OFF/OP CNSLTJ NEW/EST LOW 30: CPT

## 2021-04-29 PROCEDURE — 77080 DXA BONE DENSITY AXIAL: CPT

## 2021-05-05 NOTE — PHYSICAL EXAM
[Normal] : supple, no neck mass and thyroid not enlarged [Normal Neck Lymph Nodes] : normal neck lymph nodes  [Normal Supraclavicular Lymph Nodes] : normal supraclavicular lymph nodes [Normal Groin Lymph Nodes] : normal groin lymph nodes [Normal Axillary Lymph Nodes] : normal axillary lymph nodes [Normal] : oriented to person, place and time, with appropriate affect [de-identified] : the breasts are symmetric. There are no areas of skin thickening or tethering, nipple inversion/scaling/discharge. No new findings in the right breast. In the left breast at the 9:00 position, 3cm from nipple is a new 1cm ovoid, compressible, well-defined, soft, mobile nodule.

## 2021-05-05 NOTE — ASSESSMENT
[FreeTextEntry1] : 61 year old woman with newly palpated nodule in right breast at 9:00. Benign on physical exam, correlates to 10:00 6mm benign appearing nodule on imaging. BIRADS 3. Will follow up with left breast sono in 6 months.

## 2021-10-13 ENCOUNTER — OUTPATIENT (OUTPATIENT)
Dept: OUTPATIENT SERVICES | Facility: HOSPITAL | Age: 61
LOS: 1 days | End: 2021-10-13
Payer: COMMERCIAL

## 2021-10-13 ENCOUNTER — RESULT REVIEW (OUTPATIENT)
Age: 61
End: 2021-10-13

## 2021-10-13 ENCOUNTER — APPOINTMENT (OUTPATIENT)
Dept: ULTRASOUND IMAGING | Facility: CLINIC | Age: 61
End: 2021-10-13
Payer: COMMERCIAL

## 2021-10-13 DIAGNOSIS — Z90.49 ACQUIRED ABSENCE OF OTHER SPECIFIED PARTS OF DIGESTIVE TRACT: Chronic | ICD-10-CM

## 2021-10-13 DIAGNOSIS — R92.8 OTHER ABNORMAL AND INCONCLUSIVE FINDINGS ON DIAGNOSTIC IMAGING OF BREAST: ICD-10-CM

## 2021-10-13 PROCEDURE — 76641 ULTRASOUND BREAST COMPLETE: CPT

## 2021-10-13 PROCEDURE — 76641 ULTRASOUND BREAST COMPLETE: CPT | Mod: 26,50

## 2021-10-14 ENCOUNTER — APPOINTMENT (OUTPATIENT)
Dept: SURGICAL ONCOLOGY | Facility: CLINIC | Age: 61
End: 2021-10-14
Payer: COMMERCIAL

## 2021-10-14 ENCOUNTER — RESULT REVIEW (OUTPATIENT)
Age: 61
End: 2021-10-14

## 2021-10-14 VITALS
HEART RATE: 67 BPM | HEIGHT: 70 IN | BODY MASS INDEX: 20.19 KG/M2 | WEIGHT: 141 LBS | SYSTOLIC BLOOD PRESSURE: 121 MMHG | DIASTOLIC BLOOD PRESSURE: 78 MMHG | OXYGEN SATURATION: 100 %

## 2021-10-14 PROCEDURE — 99214 OFFICE O/P EST MOD 30 MIN: CPT

## 2021-10-18 NOTE — REASON FOR VISIT
[Follow-Up Visit] : a follow-up visit for [Fibrocystic Breast] : fibrocystic breast [FreeTextEntry2] : Fibrocystic Breasts

## 2021-10-18 NOTE — ASSESSMENT
[FreeTextEntry1] : 61 year old woman with fibrocystic breasts and likely stable findings of cysts/intramammary lymph nodes. Sonogram performed at 6 month interval correlates with physical exam findings. In April she will be back on schedule for her yearly screening. She will likely need to go forward with yearly mammo and sono together, so I have ordered both for 4/22.

## 2021-10-18 NOTE — PHYSICAL EXAM
[Normal] : supple, no neck mass and thyroid not enlarged [Normal Neck Lymph Nodes] : normal neck lymph nodes  [Normal Supraclavicular Lymph Nodes] : normal supraclavicular lymph nodes [Normal Groin Lymph Nodes] : normal groin lymph nodes [Normal Axillary Lymph Nodes] : normal axillary lymph nodes [Normal] : oriented to person, place and time, with appropriate affect [de-identified] : the breasts are symmetric. There are no areas of skin thickening or tethering, nipple inversion/scaling/discharge. No new findings in the right breast. In the left breast at the 9:00 position, 3cm from nipple is a stable 1cm ovoid, compressible, well-defined, soft, mobile nodule.

## 2021-10-18 NOTE — HISTORY OF PRESENT ILLNESS
[de-identified] : Ms. Friedman is a 59 year old healthy woman who is formerly a patient of Dr. Lezama. She comes for yearly breast imaging and clinical exam. Her last mammo-sono was done in Feb, 2018 and demonstrated BIRADS2 findings. She denies any new breast lesions, nipple changes, or changes in the skin or contour of her breasts. She has always been told she has very dense breasts and may need an MRI, and presents to discuss her best surveillance options going forward.\par \par INTERIM 3/25/21: Ms. Friedman returns for yearly follow up clinical breast exam. She denies changes in her breasts during the last year. She has not noted any nipple inversion or discharge, or breast masses or skin changes. Her last imaging was performed in May, 2020 that demonstrated a new BIRADS 3 finding of a likely benign nodule in the left breast at 10:00, measuring 6mm. \par \par INTERIM 4/29/21: She is here in follow up after completing annual mammo/sono on 4/7/21. The nodule in her left breast at 10:00 was noted to be stable however imaging was given a BIRADS3 with recommendation to have repeat imaging in 1 year. She offers no physical complaints today. She is going for her Bone Density after today's office visit. \par \par INTERIM 10/14/21: She is here today for 6 month follow up and breast exam. She completed a repeat breast US yesterday for follow up of her BIRADS3 mammo/sono from 4/2021 which were stable. No new changes were noted. She denies any breast skin changes, nipple dishcharge/bleeding or palpable masses. Overall, she is feeling well.

## 2021-10-27 NOTE — ED PROVIDER NOTE - CARDIOVASCULAR [+], MLM
[Dear  ___] : Dear  [unfilled], [Courtesy Letter:] : I had the pleasure of seeing your patient, [unfilled], in my office today. [Please see my note below.] : Please see my note below. [Consult Closing:] : Thank you very much for allowing me to participate in the care of this patient.  If you have any questions, please do not hesitate to contact me. [Sincerely,] : Sincerely, [FreeTextEntry3] : Dr BECKY López\par Child Neurology resident\par  CHEST PAIN

## 2021-11-08 ENCOUNTER — OUTPATIENT (OUTPATIENT)
Dept: OUTPATIENT SERVICES | Facility: HOSPITAL | Age: 61
LOS: 1 days | End: 2021-11-08
Payer: COMMERCIAL

## 2021-11-08 ENCOUNTER — APPOINTMENT (OUTPATIENT)
Dept: CT IMAGING | Facility: CLINIC | Age: 61
End: 2021-11-08
Payer: COMMERCIAL

## 2021-11-08 DIAGNOSIS — R10.31 RIGHT LOWER QUADRANT PAIN: ICD-10-CM

## 2021-11-08 DIAGNOSIS — Z90.49 ACQUIRED ABSENCE OF OTHER SPECIFIED PARTS OF DIGESTIVE TRACT: Chronic | ICD-10-CM

## 2021-11-08 PROCEDURE — 82565 ASSAY OF CREATININE: CPT

## 2021-11-08 PROCEDURE — 74177 CT ABD & PELVIS W/CONTRAST: CPT

## 2021-11-08 PROCEDURE — 74177 CT ABD & PELVIS W/CONTRAST: CPT | Mod: 26

## 2022-04-08 ENCOUNTER — APPOINTMENT (OUTPATIENT)
Dept: MAMMOGRAPHY | Facility: CLINIC | Age: 62
End: 2022-04-08
Payer: COMMERCIAL

## 2022-04-08 ENCOUNTER — RESULT REVIEW (OUTPATIENT)
Age: 62
End: 2022-04-08

## 2022-04-08 ENCOUNTER — OUTPATIENT (OUTPATIENT)
Dept: OUTPATIENT SERVICES | Facility: HOSPITAL | Age: 62
LOS: 1 days | End: 2022-04-08
Payer: COMMERCIAL

## 2022-04-08 ENCOUNTER — APPOINTMENT (OUTPATIENT)
Dept: ULTRASOUND IMAGING | Facility: CLINIC | Age: 62
End: 2022-04-08
Payer: COMMERCIAL

## 2022-04-08 DIAGNOSIS — Z00.8 ENCOUNTER FOR OTHER GENERAL EXAMINATION: ICD-10-CM

## 2022-04-08 DIAGNOSIS — Z90.49 ACQUIRED ABSENCE OF OTHER SPECIFIED PARTS OF DIGESTIVE TRACT: Chronic | ICD-10-CM

## 2022-04-08 PROCEDURE — 77067 SCR MAMMO BI INCL CAD: CPT | Mod: 26

## 2022-04-08 PROCEDURE — 76641 ULTRASOUND BREAST COMPLETE: CPT | Mod: 26,50

## 2022-04-08 PROCEDURE — 77067 SCR MAMMO BI INCL CAD: CPT

## 2022-04-08 PROCEDURE — 77063 BREAST TOMOSYNTHESIS BI: CPT | Mod: 26

## 2022-04-08 PROCEDURE — 77063 BREAST TOMOSYNTHESIS BI: CPT

## 2022-04-08 PROCEDURE — 76641 ULTRASOUND BREAST COMPLETE: CPT

## 2022-07-30 ENCOUNTER — EMERGENCY (EMERGENCY)
Facility: HOSPITAL | Age: 62
LOS: 0 days | Discharge: HOME | End: 2022-07-30
Attending: EMERGENCY MEDICINE
Payer: COMMERCIAL

## 2022-07-30 VITALS
DIASTOLIC BLOOD PRESSURE: 82 MMHG | HEART RATE: 71 BPM | OXYGEN SATURATION: 99 % | SYSTOLIC BLOOD PRESSURE: 141 MMHG | TEMPERATURE: 98 F | RESPIRATION RATE: 17 BRPM

## 2022-07-30 VITALS — WEIGHT: 141.1 LBS | HEIGHT: 70 IN

## 2022-07-30 DIAGNOSIS — C44.91 BASAL CELL CARCINOMA OF SKIN, UNSPECIFIED: ICD-10-CM

## 2022-07-30 DIAGNOSIS — Z88.5 ALLERGY STATUS TO NARCOTIC AGENT: ICD-10-CM

## 2022-07-30 DIAGNOSIS — F17.200 NICOTINE DEPENDENCE, UNSPECIFIED, UNCOMPLICATED: ICD-10-CM

## 2022-07-30 DIAGNOSIS — Z90.49 ACQUIRED ABSENCE OF OTHER SPECIFIED PARTS OF DIGESTIVE TRACT: Chronic | ICD-10-CM

## 2022-07-30 DIAGNOSIS — R07.2 PRECORDIAL PAIN: ICD-10-CM

## 2022-07-30 DIAGNOSIS — Z90.49 ACQUIRED ABSENCE OF OTHER SPECIFIED PARTS OF DIGESTIVE TRACT: ICD-10-CM

## 2022-07-30 DIAGNOSIS — Z87.19 PERSONAL HISTORY OF OTHER DISEASES OF THE DIGESTIVE SYSTEM: ICD-10-CM

## 2022-07-30 DIAGNOSIS — Z90.722 ACQUIRED ABSENCE OF OVARIES, BILATERAL: ICD-10-CM

## 2022-07-30 DIAGNOSIS — E03.9 HYPOTHYROIDISM, UNSPECIFIED: ICD-10-CM

## 2022-07-30 DIAGNOSIS — Z98.890 OTHER SPECIFIED POSTPROCEDURAL STATES: ICD-10-CM

## 2022-07-30 LAB
ALBUMIN SERPL ELPH-MCNC: 3.6 G/DL — SIGNIFICANT CHANGE UP (ref 3.3–5)
ALP SERPL-CCNC: 76 U/L — SIGNIFICANT CHANGE UP (ref 40–120)
ALT FLD-CCNC: 23 U/L — SIGNIFICANT CHANGE UP (ref 12–78)
ANION GAP SERPL CALC-SCNC: 3 MMOL/L — LOW (ref 5–17)
APTT BLD: 29.1 SEC — SIGNIFICANT CHANGE UP (ref 27.5–35.5)
AST SERPL-CCNC: 15 U/L — SIGNIFICANT CHANGE UP (ref 15–37)
BASOPHILS # BLD AUTO: 0.04 K/UL — SIGNIFICANT CHANGE UP (ref 0–0.2)
BASOPHILS NFR BLD AUTO: 0.8 % — SIGNIFICANT CHANGE UP (ref 0–2)
BILIRUB SERPL-MCNC: 0.4 MG/DL — SIGNIFICANT CHANGE UP (ref 0.2–1.2)
BUN SERPL-MCNC: 15 MG/DL — SIGNIFICANT CHANGE UP (ref 7–23)
CALCIUM SERPL-MCNC: 9.5 MG/DL — SIGNIFICANT CHANGE UP (ref 8.5–10.1)
CHLORIDE SERPL-SCNC: 110 MMOL/L — HIGH (ref 96–108)
CO2 SERPL-SCNC: 29 MMOL/L — SIGNIFICANT CHANGE UP (ref 22–31)
CREAT SERPL-MCNC: 0.68 MG/DL — SIGNIFICANT CHANGE UP (ref 0.5–1.3)
EGFR: 97 ML/MIN/1.73M2 — SIGNIFICANT CHANGE UP
EOSINOPHIL # BLD AUTO: 0.16 K/UL — SIGNIFICANT CHANGE UP (ref 0–0.5)
EOSINOPHIL NFR BLD AUTO: 3.3 % — SIGNIFICANT CHANGE UP (ref 0–6)
GLUCOSE SERPL-MCNC: 98 MG/DL — SIGNIFICANT CHANGE UP (ref 70–99)
HCT VFR BLD CALC: 41.8 % — SIGNIFICANT CHANGE UP (ref 34.5–45)
HGB BLD-MCNC: 13.5 G/DL — SIGNIFICANT CHANGE UP (ref 11.5–15.5)
IMM GRANULOCYTES NFR BLD AUTO: 0.2 % — SIGNIFICANT CHANGE UP (ref 0–1.5)
INR BLD: 0.92 RATIO — SIGNIFICANT CHANGE UP (ref 0.88–1.16)
LYMPHOCYTES # BLD AUTO: 1.32 K/UL — SIGNIFICANT CHANGE UP (ref 1–3.3)
LYMPHOCYTES # BLD AUTO: 27 % — SIGNIFICANT CHANGE UP (ref 13–44)
MAGNESIUM SERPL-MCNC: 2.3 MG/DL — SIGNIFICANT CHANGE UP (ref 1.6–2.6)
MCHC RBC-ENTMCNC: 30.1 PG — SIGNIFICANT CHANGE UP (ref 27–34)
MCHC RBC-ENTMCNC: 32.3 GM/DL — SIGNIFICANT CHANGE UP (ref 32–36)
MCV RBC AUTO: 93.3 FL — SIGNIFICANT CHANGE UP (ref 80–100)
MONOCYTES # BLD AUTO: 0.46 K/UL — SIGNIFICANT CHANGE UP (ref 0–0.9)
MONOCYTES NFR BLD AUTO: 9.4 % — SIGNIFICANT CHANGE UP (ref 2–14)
NEUTROPHILS # BLD AUTO: 2.9 K/UL — SIGNIFICANT CHANGE UP (ref 1.8–7.4)
NEUTROPHILS NFR BLD AUTO: 59.3 % — SIGNIFICANT CHANGE UP (ref 43–77)
PLATELET # BLD AUTO: 273 K/UL — SIGNIFICANT CHANGE UP (ref 150–400)
POTASSIUM SERPL-MCNC: 3.9 MMOL/L — SIGNIFICANT CHANGE UP (ref 3.5–5.3)
POTASSIUM SERPL-SCNC: 3.9 MMOL/L — SIGNIFICANT CHANGE UP (ref 3.5–5.3)
PROT SERPL-MCNC: 6.7 GM/DL — SIGNIFICANT CHANGE UP (ref 6–8.3)
PROTHROM AB SERPL-ACNC: 10.7 SEC — SIGNIFICANT CHANGE UP (ref 10.5–13.4)
RBC # BLD: 4.48 M/UL — SIGNIFICANT CHANGE UP (ref 3.8–5.2)
RBC # FLD: 13.6 % — SIGNIFICANT CHANGE UP (ref 10.3–14.5)
SODIUM SERPL-SCNC: 142 MMOL/L — SIGNIFICANT CHANGE UP (ref 135–145)
TROPONIN I, HIGH SENSITIVITY RESULT: 3.4 NG/L — SIGNIFICANT CHANGE UP
WBC # BLD: 4.89 K/UL — SIGNIFICANT CHANGE UP (ref 3.8–10.5)
WBC # FLD AUTO: 4.89 K/UL — SIGNIFICANT CHANGE UP (ref 3.8–10.5)

## 2022-07-30 PROCEDURE — 93010 ELECTROCARDIOGRAM REPORT: CPT

## 2022-07-30 PROCEDURE — 99285 EMERGENCY DEPT VISIT HI MDM: CPT

## 2022-07-30 PROCEDURE — 83735 ASSAY OF MAGNESIUM: CPT

## 2022-07-30 PROCEDURE — 80053 COMPREHEN METABOLIC PANEL: CPT

## 2022-07-30 PROCEDURE — 85730 THROMBOPLASTIN TIME PARTIAL: CPT

## 2022-07-30 PROCEDURE — 71045 X-RAY EXAM CHEST 1 VIEW: CPT | Mod: 26

## 2022-07-30 PROCEDURE — 36415 COLL VENOUS BLD VENIPUNCTURE: CPT

## 2022-07-30 PROCEDURE — 93005 ELECTROCARDIOGRAM TRACING: CPT

## 2022-07-30 PROCEDURE — 85025 COMPLETE CBC W/AUTO DIFF WBC: CPT

## 2022-07-30 PROCEDURE — 71045 X-RAY EXAM CHEST 1 VIEW: CPT

## 2022-07-30 PROCEDURE — 99285 EMERGENCY DEPT VISIT HI MDM: CPT | Mod: 25

## 2022-07-30 PROCEDURE — 85610 PROTHROMBIN TIME: CPT

## 2022-07-30 PROCEDURE — 96374 THER/PROPH/DIAG INJ IV PUSH: CPT

## 2022-07-30 PROCEDURE — 84484 ASSAY OF TROPONIN QUANT: CPT

## 2022-07-30 RX ORDER — LIDOCAINE 4 G/100G
10 CREAM TOPICAL ONCE
Refills: 0 | Status: COMPLETED | OUTPATIENT
Start: 2022-07-30 | End: 2022-07-30

## 2022-07-30 RX ORDER — FAMOTIDINE 10 MG/ML
20 INJECTION INTRAVENOUS ONCE
Refills: 0 | Status: COMPLETED | OUTPATIENT
Start: 2022-07-30 | End: 2022-07-30

## 2022-07-30 RX ADMIN — Medication 30 MILLILITER(S): at 08:54

## 2022-07-30 RX ADMIN — FAMOTIDINE 20 MILLIGRAM(S): 10 INJECTION INTRAVENOUS at 06:09

## 2022-07-30 RX ADMIN — LIDOCAINE 10 MILLILITER(S): 4 CREAM TOPICAL at 08:55

## 2022-07-30 NOTE — ED PROVIDER NOTE - PROGRESS NOTE DETAILS
s/p pending 2nd troponin and discharge. patient feels chest pain is better, had another episode of reflux symptoms when sleeping flat.

## 2022-07-30 NOTE — ED PROVIDER NOTE - NSICDXPASTSURGICALHX_GEN_ALL_CORE_FT
PAST SURGICAL HISTORY:  History of colon resection     S/P oophorectomy 2009    S/P wrist surgery right

## 2022-07-30 NOTE — ED PROVIDER NOTE - CARE PROVIDER_API CALL
Gulshan Gold)  Internal Medicine  [address]  [place] [address]  Phone: [phone]  Fax: [phone]  Established Patient  Follow Up Time: 1-3 Days    Bryan Negrete (DO)  Cardiology  [address] [place] [address]  Phone: [phone]  Fax: [phone]  Follow Up Time: 4-6 Days    Gera Parikh)  Gastroenterology; Internal Medicine  [address], [address]  [place] [address]  Phone: [phone]  Fax: [phone]  Follow Up Time: 4-6 Days

## 2022-07-30 NOTE — ED PROVIDER NOTE - PROVIDER TOKENS
PROVIDER:[TOKEN:[[id number]],FOLLOWUP:[1-3 Days],ESTABLISHEDPATIENT:[T]],PROVIDER:[TOKEN:[[id number]],FOLLOWUP:[4-6 Days]],PROVIDER:[TOKEN:[[id number]],FOLLOWUP:[4-6 Days]]

## 2022-07-30 NOTE — ED PROVIDER NOTE - OBJECTIVE STATEMENT
62-year-old female with history of hypothyroidism presents with midsternal chest pain described as a pressure radiating into her neck for the past few hours.  Patient notes that this started shortly after sexual intercourse.  Patient states that the pain has been constant and approximately 6 out of 10 in intensity.  Is not associated with shortness of breath or diaphoresis.  Patient denies any obvious exacerbating or relieving factors at this time.  She states that she checked her apple watch and noted that her heart rate was in the 70s when it normally runs in the 60s.  Patient had a trip to [place] in June but denies any leg pain or swelling.  Patient denies any drug use but smokes rarely and has occasional alcohol with 1 martini last night.

## 2022-07-30 NOTE — ED PROVIDER NOTE - PATIENT PORTAL LINK FT
You can access the FollowMyHealth Patient Portal offered by [facility] by registering at the following website: [email or web address]. By joining [employer]’s FollowMyHealth portal, you will also be able to view your health information using other applications (apps) compatible with our system.

## 2022-07-30 NOTE — ED PROVIDER NOTE - NSFOLLOWUPINSTRUCTIONS_ED_ALL_ED_FT
Please follow up with your Primary Care Physician, Cardiology and gastroenterology.  Please take famotidine (available over the counter) for acid reflux.  If your symptoms persist or worsen, please seek care. Either return to the Emergency Department, go to urgent care or see your primary care doctor.  See refer to general information and follow up instructions below:     Chest Pain    WHAT YOU NEED TO KNOW:    Chest pain can be caused by a range of conditions, from not serious to life-threatening. Chest pain can be a symptom of a digestive problem, such as acid reflux or a stomach ulcer. An anxiety attack or a strong emotion, such as anger, can also cause chest pain. Infection, inflammation, or a fracture in the bones or cartilage in your chest can cause pain or discomfort. Sometimes chest pain or pressure is caused by poor blood flow to your heart (angina). Chest pain may also be caused by life-threatening conditions such as a heart attack or blood clot in your lungs.     DISCHARGE INSTRUCTIONS:    Call 911 if:     You have any of the following signs of a heart attack:   Squeezing, pressure, or pain in your chest       and any of the following:   Discomfort or pain in your back, neck, jaw, stomach, or arm       Shortness of breath      Nausea or vomiting      Lightheadedness or a sudden cold sweat        Return to the emergency department if:     You have chest discomfort that gets worse, even with medicine.      You cough or vomit blood.       Your bowel movements are black or bloody.       You cannot stop vomiting, or it hurts to swallow.     Contact your healthcare provider if:     You have questions or concerns about your condition or care.        Medicines:     Medicines may be given to treat the cause of your chest pain. Examples include pain medicine, anxiety medicine, or medicines to increase blood flow to your heart.       Do not take certain medicines without asking your healthcare provider first. These include NSAIDs, herbal or vitamin supplements, or hormones (estrogen or progestin).       Take your medicine as directed. Contact your healthcare provider if you think your medicine is not helping or if you have side effects. Tell him or her if you are allergic to any medicine. Keep a list of the medicines, vitamins, and herbs you take. Include the amounts, and when and why you take them. Bring the list or the pill bottles to follow-up visits. Carry your medicine list with you in case of an emergency.    Follow up with your healthcare provider within 72 hours, or as directed: You may need to return for more tests to find the cause of your chest pain. You may be referred to a specialist, such as a cardiologist or gastroenterologist. Write down your questions so you remember to ask them during your visits.     Healthy living tips: The following are general healthy guidelines. If your chest pain is caused by a heart problem, your healthcare provider will give you specific guidelines to follow.    Do not smoke. Nicotine and other chemicals in cigarettes and cigars can cause lung and heart damage. Ask your healthcare provider for information if you currently smoke and need help to quit. E-cigarettes or smokeless tobacco still contain nicotine. Talk to your healthcare provider before you use these products.       Eat a variety of healthy, low-fat, low-salt foods. Healthy foods include fruits, vegetables, whole-grain breads, low-fat dairy products, beans, lean meats, and fish. Ask for more information about a heart healthy diet.      Drink plenty of water every day. Your body is made of mostly water. Water helps your body to control your temperature and blood pressure. Ask your healthcare provider how much water you should drink every day.      Ask about activity. Your healthcare provider will tell you which activities to limit or avoid. Ask when you can drive, return to work, and have sex. Ask about the best exercise plan for you.      Maintain a healthy weight. Ask your healthcare provider how much you should weigh. Ask him or her to help you create a weight loss plan if you are overweight.       Get the flu and pneumonia vaccines. All adults should get the influenza (flu) vaccine. Get it every year as soon as it becomes available. The pneumococcal vaccine is given to adults aged 65 years or older. The vaccine is given every 5 years to prevent pneumococcal disease, such as pneumonia.    If you have a stent:     Carry your stent card with you at all times.       Let all healthcare providers know that you have a stent.    ====================================================================

## 2022-07-30 NOTE — ED PROVIDER NOTE - CARE PROVIDERS DIRECT ADDRESSES
,DirectAddress_Unknown,[email or web address],DirectAddress_Unknown

## 2022-07-30 NOTE — ED ADULT TRIAGE NOTE - CHIEF COMPLAINT QUOTE
complains of constant dull mid sternal chest pain since midnight, went to sleep and woke up with persistent pain. denies cardiac history. denies shortness of breath.   cardiologist: dr. shane

## 2022-07-30 NOTE — ED PROVIDER NOTE - NSICDXPASTMEDICALHX_GEN_ALL_CORE_FT
PAST MEDICAL HISTORY:  Basal cell cancer     Diverticulitis of large intestine with abscess without bleeding     Hypothyroid

## 2022-07-30 NOTE — ED ADULT NURSE NOTE - OBJECTIVE STATEMENT
pt states she started to have minor chest pain after intercourse. pt went to sleep with the pain and woke up still experiencing it. decided to come to ED

## 2023-04-03 ENCOUNTER — RESULT REVIEW (OUTPATIENT)
Age: 63
End: 2023-04-03

## 2023-04-13 ENCOUNTER — RESULT REVIEW (OUTPATIENT)
Age: 63
End: 2023-04-13

## 2023-04-13 ENCOUNTER — OUTPATIENT (OUTPATIENT)
Dept: OUTPATIENT SERVICES | Facility: HOSPITAL | Age: 63
LOS: 1 days | End: 2023-04-13
Payer: MEDICARE

## 2023-04-13 ENCOUNTER — APPOINTMENT (OUTPATIENT)
Dept: ULTRASOUND IMAGING | Facility: CLINIC | Age: 63
End: 2023-04-13
Payer: MEDICARE

## 2023-04-13 ENCOUNTER — APPOINTMENT (OUTPATIENT)
Dept: MAMMOGRAPHY | Facility: CLINIC | Age: 63
End: 2023-04-13
Payer: MEDICARE

## 2023-04-13 DIAGNOSIS — N60.99 UNSPECIFIED BENIGN MAMMARY DYSPLASIA OF UNSPECIFIED BREAST: ICD-10-CM

## 2023-04-13 DIAGNOSIS — Z00.00 ENCOUNTER FOR GENERAL ADULT MEDICAL EXAMINATION WITHOUT ABNORMAL FINDINGS: ICD-10-CM

## 2023-04-13 DIAGNOSIS — Z90.49 ACQUIRED ABSENCE OF OTHER SPECIFIED PARTS OF DIGESTIVE TRACT: Chronic | ICD-10-CM

## 2023-04-13 PROCEDURE — 77067 SCR MAMMO BI INCL CAD: CPT

## 2023-04-13 PROCEDURE — 77063 BREAST TOMOSYNTHESIS BI: CPT

## 2023-04-13 PROCEDURE — 76641 ULTRASOUND BREAST COMPLETE: CPT

## 2023-04-13 PROCEDURE — 77067 SCR MAMMO BI INCL CAD: CPT | Mod: 26

## 2023-04-13 PROCEDURE — 77063 BREAST TOMOSYNTHESIS BI: CPT | Mod: 26

## 2023-04-13 PROCEDURE — 76641 ULTRASOUND BREAST COMPLETE: CPT | Mod: 26,50,GA

## 2023-07-06 ENCOUNTER — OUTPATIENT (OUTPATIENT)
Dept: OUTPATIENT SERVICES | Facility: HOSPITAL | Age: 63
LOS: 1 days | End: 2023-07-06
Payer: MEDICARE

## 2023-07-06 ENCOUNTER — APPOINTMENT (OUTPATIENT)
Dept: RADIOLOGY | Facility: CLINIC | Age: 63
End: 2023-07-06
Payer: MEDICARE

## 2023-07-06 DIAGNOSIS — E03.8 OTHER SPECIFIED HYPOTHYROIDISM: ICD-10-CM

## 2023-07-06 DIAGNOSIS — R53.83 OTHER FATIGUE: ICD-10-CM

## 2023-07-06 DIAGNOSIS — L65.9 NONSCARRING HAIR LOSS, UNSPECIFIED: ICD-10-CM

## 2023-07-06 DIAGNOSIS — Z90.49 ACQUIRED ABSENCE OF OTHER SPECIFIED PARTS OF DIGESTIVE TRACT: Chronic | ICD-10-CM

## 2023-07-06 DIAGNOSIS — E06.3 AUTOIMMUNE THYROIDITIS: ICD-10-CM

## 2023-07-06 PROCEDURE — 77080 DXA BONE DENSITY AXIAL: CPT

## 2023-07-06 PROCEDURE — 77080 DXA BONE DENSITY AXIAL: CPT | Mod: 26

## 2023-12-21 ENCOUNTER — APPOINTMENT (OUTPATIENT)
Dept: SURGICAL ONCOLOGY | Facility: CLINIC | Age: 63
End: 2023-12-21
Payer: MEDICARE

## 2023-12-21 VITALS
HEART RATE: 63 BPM | SYSTOLIC BLOOD PRESSURE: 133 MMHG | DIASTOLIC BLOOD PRESSURE: 82 MMHG | BODY MASS INDEX: 27 KG/M2 | TEMPERATURE: 97.5 F | RESPIRATION RATE: 14 BRPM | WEIGHT: 143 LBS | OXYGEN SATURATION: 99 % | HEIGHT: 61 IN

## 2023-12-21 PROCEDURE — 99213 OFFICE O/P EST LOW 20 MIN: CPT

## 2024-01-04 NOTE — ASSESSMENT
[FreeTextEntry1] : 63 year old woman with fibroglandular dense breasts. Normal exam and BIRADS 2 imaging from 4/2023.  Plan: higinio/sono in 4/2024 and repeat visit then to get back on yearly scheduling.

## 2024-01-04 NOTE — HISTORY OF PRESENT ILLNESS
[de-identified] : Ms. Friedman is a 59 year old healthy woman who is formerly a patient of Dr. Lezama. She comes for yearly breast imaging and clinical exam. Her last mammo-sono was done in Feb, 2018 and demonstrated BIRADS2 findings. She denies any new breast lesions, nipple changes, or changes in the skin or contour of her breasts. She has always been told she has very dense breasts and may need an MRI, and presents to discuss her best surveillance options going forward. INTERIM 3/25/21: Ms. Friedman returns for yearly follow up clinical breast exam. She denies changes in her breasts during the last year. She has not noted any nipple inversion or discharge, or breast masses or skin changes. Her last imaging was performed in May, 2020 that demonstrated a new BIRADS 3 finding of a likely benign nodule in the left breast at 10:00, measuring 6mm.  INTERIM 4/29/21: She is here in follow up after completing annual mammo/sono on 4/7/21. The nodule in her left breast at 10:00 was noted to be stable however imaging was given a BIRADS3 with recommendation to have repeat imaging in 1 year. She offers no physical complaints today. She is going for her Bone Density after today's office visit. INTERIM 10/14/21: She is here today for 6 month follow up and breast exam. She completed a repeat breast US yesterday for follow up of her BIRADS3 mammo/sono from 4/2021 which were stable. No new changes were noted. She denies any breast skin changes, nipple discharge/bleeding or palpable masses. Overall, she is feeling well.  INTERIM 12/21/23 Ms. Friedman presents to the office for follow up  visit, last breast imagining in April 2023. BIRADs 2 benign findings. No new changes were noted. She denies any breast skin changes, nipple discharge/bleeding or palpable masses. Overall, she is feeling well. She is up to date with her GYN exams sees Dr. Georgette Mercedes.

## 2024-01-04 NOTE — PHYSICAL EXAM
[Normal] : normal breast inspection and palpation of axillas [Normal] : oriented to person, place and time, with appropriate affect [de-identified] : symmetric to inspection and palpation. dense fibro-glandular b/l, equal, without dominant lesions/cysts. no nipple discharge. no adenopathy

## 2024-04-13 ENCOUNTER — EMERGENCY (EMERGENCY)
Facility: HOSPITAL | Age: 64
LOS: 1 days | Discharge: HOME | End: 2024-04-13
Attending: EMERGENCY MEDICINE | Admitting: EMERGENCY MEDICINE
Payer: MEDICARE

## 2024-04-13 VITALS
RESPIRATION RATE: 16 BRPM | WEIGHT: 141.1 LBS | HEIGHT: 70 IN | TEMPERATURE: 98 F | SYSTOLIC BLOOD PRESSURE: 117 MMHG | HEART RATE: 74 BPM | DIASTOLIC BLOOD PRESSURE: 75 MMHG | OXYGEN SATURATION: 95 %

## 2024-04-13 VITALS
TEMPERATURE: 98 F | OXYGEN SATURATION: 97 % | SYSTOLIC BLOOD PRESSURE: 146 MMHG | RESPIRATION RATE: 16 BRPM | HEART RATE: 76 BPM | DIASTOLIC BLOOD PRESSURE: 86 MMHG

## 2024-04-13 DIAGNOSIS — Z90.49 ACQUIRED ABSENCE OF OTHER SPECIFIED PARTS OF DIGESTIVE TRACT: Chronic | ICD-10-CM

## 2024-04-13 PROCEDURE — 99284 EMERGENCY DEPT VISIT MOD MDM: CPT | Mod: FS

## 2024-04-13 PROCEDURE — 93971 EXTREMITY STUDY: CPT

## 2024-04-13 PROCEDURE — 99284 EMERGENCY DEPT VISIT MOD MDM: CPT | Mod: 25

## 2024-04-13 PROCEDURE — 93971 EXTREMITY STUDY: CPT | Mod: 26,LT

## 2024-04-13 NOTE — ED ADULT TRIAGE NOTE - CHIEF COMPLAINT QUOTE
63 y/o female presents aox4 ambulatory c/o aching pain to posterior LLE since she woke up today, pt went to urgent care and was referred to the ED for r/o dvt. denies fall/trauma.

## 2024-04-13 NOTE — ED PROVIDER NOTE - CLINICAL SUMMARY MEDICAL DECISION MAKING FREE TEXT BOX
64-year-old female with history of hormone replacement therapy for menopausal symptoms complaining of left posterior calf pain since this morning.  Patient was unsure if it was related to her recent workout or a blood clot.  Was seen at urgent care and referred to ER for venous Doppler.  Denies fever chest pain shortness of breath cough or other symptom or problem.  Her PCP is Dr. Sarah Cantor    Physical exam vital signs stable afebrile no distress.  Heart and lungs normal.  Extremities there is no posterior calf swelling tenderness cords or Homans.  Full range of motion pulses and sensation intact throughout.  Impression is left calf pain rule out DVT.  Plan is venous Doppler if negative refer to PCP/Ortho for further evaluation and treatment.

## 2024-04-13 NOTE — ED ADULT NURSE NOTE - NSFALLRISKASMTTYPE_ED_ALL_ED
INFECTIOUS DISEASE PROGRESS NOTE    Patient: Dk Lo Date: 10/8/2019   : 1955 Attending: No att. providers found   63 year old male        Subjective:   Patient is a 63 year old male here for follow up    History of Present Illness:    63 year old male with history  of recurrent transitional carcinoma of the bladder, right, and left distal ureters with bilateral stent placement and a metastatic T11 lesion.   S/P T 11 corpectomy  Has a non healing surgical site ulcer which was present since January, the ulcer  tracked to the hardware  On 19 he had the following procedure  PROCEDURES PERFORMED:([facility])  1. Removal of instrumentation- cross connector   Exploration of fusion   50% laminectomy   Debridement of ulcer       He was started on Doxycycline and Rifampin post surgery based on cultures    Antibiotics:   Doxycycline , Rifampin 19  Culture Results:    Specimen Description (no units)   Date Value   2019 Surgical Hardware (A) CROSS LINK   2019 Surgical Hardware (A) CROSS LINK   2019 SWAB WOUND BACK MIDDLE   2019 WOUND, DEEP BACK MIDLINE SPINE DEEP WOUND CULTURE      CULTURE (no units)   Date Value   2019 FEW STAPHYLOCOCCUS AUREUS (P)   2019 MODERATE STREPTOCOCCUS GROUP C (P)   2019 NO ANAEROBES ISOLATED   2019 NO GROWTH 12 DAYS.     MSSA resistant to Clinda and erythromycin      Cytologic Interpretation :         Bladder washing:     -  Papillary clusters of urothelial cells in a background of acute     inflammation (see description); no high-grade malignant cells identified.               Patient Active Problem List    Diagnosis Date Noted   • Non-healing non-surgical wound 2019     Priority: Low     Pressure ulcer low back     • Neurogenic orthostatic hypotension (CMS/[facility]) 2019     Priority: Low   • Pressure injury of lower back, stage 3 (CMS/HCC) 04/10/2019     Priority: Low   • Renal cell carcinoma (CMS/[facility]) 2019      Priority: Low   • Complete lesion at T11 level of thoracic spinal cord (CMS/HCC) 12/12/2018     Priority: Low   • Hypercalcemia 12/12/2018     Priority: Low   • Cerebrovascular accident (CMS/HCC) 09/07/2017     Priority: Low     treated 7/06/2002 after residual left sided weakness and left  facial droop in which he was later found to have occlusion of right internal carotid artery and right middle cerebral artery.      • History of bladder cancer 10/10/2016     Priority: Low   • CKD (chronic kidney disease) stage 3, GFR 30-59 ml/min (CMS/HCC) 07/05/2016     Priority: Low   • Dyslipidemia 07/05/2016     Priority: Low   • Vitamin D insufficiency 04/20/2016     Priority: Low   • Renal mass, right 03/11/2016     Priority: Low   • Colonoscopy refused 03/11/2016     Priority: Low   • Malignant neoplasm of urinary bladder (CMS/HCC) 02/24/2016     Priority: Low   • CKD (chronic kidney disease) stage 2, GFR 60-89 ml/min 02/24/2016     Priority: Low   • Essential hypertension 10/01/2015     Priority: Low   • Vitamin D deficiency 10/01/2015     Priority: Low       Current Outpatient Medications   Medication Sig Dispense Refill   • traMADol (ULTRAM) 50 MG tablet Take 1 tablet by mouth every 6 hours as needed for Pain. 30 tablet 0   • tamsulosin (FLOMAX) 0.4 MG Cap Take 1 capsule by mouth daily after a meal. 30 capsule 6   • oxyCODONE-acetaminophen (PERCOCET) 5-325 MG per tablet Take 1-2 tablets by mouth every 4 hours as needed for Pain. 50 tablet 0   • docusate sodium-sennosides (SENOKOT S) 50-8.6 MG per tablet Take 2 tablets by mouth nightly as needed.      • ondansetron (ZOFRAN ODT) 4 MG disintegrating tablet Place 1 tablet onto the tongue 2 times daily as needed for Nausea. 20 tablet 0   • fludrocortisone (FLORINEF) 0.1 MG tablet Take 1 tablet by mouth daily. 60 tablet 0   • finasteride (PROSCAR) 5 MG tablet Take 1 tablet by mouth daily. 30 tablet 0   • atorvastatin (LIPITOR) 40 MG tablet Take 1 tablet by mouth every  evening. 30 tablet 3   • polyethylene glycol (MIRALAX) powder Take 17 g by mouth daily. (Patient taking differently: Take 17 g by mouth daily. Indications: Constipation And prn if needed) 255 g 0   • doxycycline hyclate (VIBRAMYCIN) 100 MG capsule Take 1 capsule by mouth 2 times daily. 56 capsule 0   • rifAMPin (RIFADIN) 300 MG capsule Take 2 capsules by mouth daily. 28 capsule 0   • acetaminophen (TYLENOL) 325 MG tablet Take 2 tablets by mouth as needed. 30 tablet 0   • albuterol 108 (90 Base) MCG/ACT inhaler Inhale 2 puffs into the lungs daily as needed for Shortness of Breath or Wheezing. Indications: Disease Involving Spasms of the Bronchus  1 Inhaler 12     No current facility-administered medications for this visit.        Review of Systems:  Patient denies nausea and vomiting, tolerating oral intake  No fevers overnight  No chest pain or shortness of breath  Pertinent items are noted in history of present illness  A 14 point comprehensive review of systems was negative     Reviewed Pertinent: Allergies, Medications, Medical History, Radiology and Labs    Last Vitals:  Visit Vitals  /74 (BP Location: RUE - Right upper extremity, Patient Position: Sitting, Cuff Size: Regular)   Pulse 96   Temp 97.7 °F (36.5 °C) (Oral)   Resp 16   Ht 6' 1\" (1.854 m)   Wt 68.9 kg   SpO2 98%   BMI 20.05 kg/m²       Weight  (Last 4 values):     Weight    10/08/19 1027   Weight: 68.9 kg         Past Medical History:    Essential (primary) hypertension                              Urinary tract infection                         8/2015        Bladder tumor                                   10/12/2015      Comment: renal cell carcinoma    Constipation                                                  Wears glasses                                                 Right-sided carotid artery disease (CMS/HCC)    2002          Bilateral inguinal hernia with obstruction      9/2015        Coronary artery disease                                        Fracture                                        1980            Comment: Right wrist  Casted    Cerebral infarction (CMS/[facility])                   2002            Comment: right carotid 100% blocked    Gastroesophageal reflux disease                               Incarcerated hernia                             09/17/2015    Vitamin D deficiency                            10/01/2015    Hypertensive urgency                            02/24/2016    Chronic kidney disease (CKD), stage III (moder* 04/11/2016    Hyperparathyroidism (CMS/HCC)                   04/11/2016    Right renal mass                                10/10/2016    Complete lesion at T11 level of thoracic spina* 12/01/2018    Hematuria                                       12/05/2018      Comment: bladder cancer, numerous TURBT procedures    Chronic kidney disease, stage II (mild)         09/24/2015      Comment: acute renal failure with hydronephrosis  Stage                2    Constipation                                    12/2018       Difficult intubation                                          Impaired mobility and ADLs                                      Comment: NH patient DNR meg, resident of [facility] in [place] (floor 1)                [phone]    Malignant neoplasm (CMS/[facility])                    10/20/15        Comment: bladder cancer    Metastatic cancer (CMS/[facility])                     12/01/2018      Comment: thoracic spine, lungs, cellebellum, bone- sees                Dr. Reynoso    Chronic pain                                                    Comment: left arm pain with movement    Decreased activities of daily living (ADL)                      Comment: uses walker at [facility] [phone] ask                for 1st floor, was homeless since 2015    High cholesterol                                              COPD (chronic obstructive pulmonary disease) (*                  Comment: prn inhaler    Cocaine abuse (CMS/[facility])                                         Comment: none since 2015    Smoker                                                          Comment: for past 43 years    Wound infection after surgery                   02/2019         Comment: had back surgery in 12/2018    Open wound of tissue overlying spine            09/2019         Comment: small open area on spinal incision not healing                probably due to bone protusion post 12/2018                surgery  Family History   Problem Relation Age of Onset   • Cancer Mother         bronchial (smoker)   • Heart disease Father    • Diabetes Father    • Obesity Brother    • Asthma Brother    • Stroke Brother    • Heart disease Brother    • Heart disease Brother    • Alcohol Abuse Brother      ALLERGIES:  No Known Allergies  Social History     Tobacco Use   • Smoking status: Light Tobacco Smoker     Packs/day: 0.25     Years: 43.00     Pack years: 10.75     Types: Cigarettes     Start date: 12/19/1975   • Smokeless tobacco: Never Used   • Tobacco comment: smoking history is approximate per patient report; 1 pack per month; states he quit from 2015-May 2018 then restarted   Substance Use Topics   • Alcohol use: No     Alcohol/week: 0.0 standard drinks     Frequency: Never     Drinks per session: 1 or 2     Binge frequency: Never     Comment: none since 2015   • Drug use: No     Comment: Crack Cocaine last 2015       Past Surgical History:    HERNIA REPAIR                                   9/17/15         Comment: inguinal hernia repar with mesh with Dr. Gonzalez    CYSTO W/URETEROSCOPY                            9/17/15       SERVICE TO UROLOGY                              1974            Comment: cysto    TRANSURETHRAL RESECTION OF BLADDER TUMOR        10/20/15      CYSTOSCOPY W/ RETROGRADES                       09/20/2016      Comment: Ureteral biopsy    THORACIC FUSION                                  12/31/2018      Comment: THORACIC 9 - LUMBAR 1 POSTERIOR FUSION (Dr. Kali Dobson, [facility])     ORIF HUMERUS FRACTURE                           03/07/2019      Comment: impending fracture. Dr Bajwa. [facility]    ORIF FEMUR FRACTURE                             03/07/2019      Comment: impending fracture. Dr Bajwa. [facility]    ORIF HIP FRACTURE                               03/18/2019      Comment: impending fracture. Dr Bajwa. [facility]    Immunization History   Administered Date(s) Administered   • None   Deferred Date(s) Deferred   • Influenza, seasonal, injectable, trivalent 09/16/2016       Physical Exam:  General: Alert and oriented x3.  HEENT: Head atraumatic without any lesions, neck supple and soft. Nose: Normal nares. Throat without exudates, ulcers, erythema.  Lungs: Clear to auscultation, no crackles or wheezing.  Chest wall normal.  CV: S1 S2 RRR no murmurs, S3, S4 rubs or gallop.  Abdomen soft and nontender, no hepatomegaly or splenomegaly. BS active in all 4 quadrants.  No edema.  Skin exam: normal  Joint exam: normal  CNS: Cranial nerves II through X!! Intact. No motor, sensory deficits.  Medications:  Current Outpatient Medications   Medication Sig Dispense Refill   • traMADol (ULTRAM) 50 MG tablet Take 1 tablet by mouth every 6 hours as needed for Pain. 30 tablet 0   • tamsulosin (FLOMAX) 0.4 MG Cap Take 1 capsule by mouth daily after a meal. 30 capsule 6   • oxyCODONE-acetaminophen (PERCOCET) 5-325 MG per tablet Take 1-2 tablets by mouth every 4 hours as needed for Pain. 50 tablet 0   • docusate sodium-sennosides (SENOKOT S) 50-8.6 MG per tablet Take 2 tablets by mouth nightly as needed.      • ondansetron (ZOFRAN ODT) 4 MG disintegrating tablet Place 1 tablet onto the tongue 2 times daily as needed for Nausea. 20 tablet 0   • fludrocortisone (FLORINEF) 0.1 MG tablet Take 1 tablet by mouth daily. 60 tablet 0   • finasteride (PROSCAR) 5 MG tablet Take 1 tablet by mouth daily. 30 tablet 0   •  atorvastatin (LIPITOR) 40 MG tablet Take 1 tablet by mouth every evening. 30 tablet 3   • polyethylene glycol (MIRALAX) powder Take 17 g by mouth daily. (Patient taking differently: Take 17 g by mouth daily. Indications: Constipation And prn if needed) 255 g 0   • doxycycline hyclate (VIBRAMYCIN) 100 MG capsule Take 1 capsule by mouth 2 times daily. 56 capsule 0   • rifAMPin (RIFADIN) 300 MG capsule Take 2 capsules by mouth daily. 28 capsule 0   • acetaminophen (TYLENOL) 325 MG tablet Take 2 tablets by mouth as needed. 30 tablet 0   • albuterol 108 (90 Base) MCG/ACT inhaler Inhale 2 puffs into the lungs daily as needed for Shortness of Breath or Wheezing. Indications: Disease Involving Spasms of the Bronchus  1 Inhaler 12     No current facility-administered medications for this visit.        Immunization:  Immunization History   Administered Date(s) Administered   • None   Deferred Date(s) Deferred   • Influenza, seasonal, injectable, trivalent 09/16/2016       Laboratory Results:    No components found for: CMP, CBC    Lab Results   Component Value Date    SODIUM 139 10/04/2019    POTASSIUM 4.4 10/04/2019    CHLORIDE 106 10/04/2019    CO2 28 10/04/2019    GLUCOSE 85 10/04/2019    BILIRUBIN 0.4 10/04/2019    GPT 14 10/04/2019    ALKPT 134 (H) 10/04/2019    ALBUMIN 3.0 (L) 10/04/2019    INR 1.1 09/19/2019    CPK 16 (L) 12/27/2018    PTT 31 09/19/2019    RESR 67 (H) 09/05/2019    CRP 4.1 (H) 09/05/2019      Ref. Range 9/19/2019 05:40 9/19/2019 06:43 9/20/2019 12:35 9/30/2019 14:27 10/4/2019 12:45   EOSIN Latest Units: % 9  1 13 9        Ref. Range 9/20/2019 12:35 9/30/2019 14:27 10/4/2019 12:45   Creatinine Latest Ref Range: 0.67 - 1.17 mg/dL 1.25 (H) 0.99 1.07     Urinalysis:    Lab Results   Component Value Date    USPG 1.025 03/02/2019    UWBC NEGATIVE 03/02/2019    URBC MODERATE (A) 03/02/2019    UBILI NEGATIVE 03/02/2019    UPH 8.0 (H) 03/02/2019    UBACTR NONE SEEN 03/02/2019         Assessment:   63 year old  male with with non healing wound (infectd) with retained hardware  S/P debridement, 50% laminectomy, Removal of Connector done on 9/9/19  Cultures with MSSA and Group C strep    Plan:  Continue Doxy and Rifampin for 4- 6 weeks until complete healing of wound  Please note patient has Eosinophilia, no rash, some itching present  Kidney function slightly elevated and needs to be followed closely    LABS CBC with diff AND CMP , CRP weekly   FAX to me at [phone]    Layla Villarreal MD  General Infectious Disease  Transplant Infectious Disease  HIV management    [facility]  [address]  [facility] [address]  T [phone]  F [phone]    10/8/2019          CC:  No Pcp                    Initial (On Arrival)

## 2024-04-13 NOTE — ED PROVIDER NOTE - PATIENT PORTAL LINK FT
You can access the FollowMyHealth Patient Portal offered by [facility] by registering at the following website: [email or web address]. By joining [employer]’s FollowMyHealth portal, you will also be able to view your health information using other applications (apps) compatible with our system.

## 2024-04-13 NOTE — ED PROVIDER NOTE - NSFOLLOWUPINSTRUCTIONS_ED_ALL_ED_FT
Follow up with your PCP/Orthopedist for re-evaluation, ongoing care and treatment. Take over the counter tylenol or motrin with food as directed for pain. If having worsening of symptoms or other related symptoms, RETURN TO THE ER IMMEDIATELY.

## 2024-04-13 NOTE — ED PROVIDER NOTE - CARE PROVIDERS DIRECT ADDRESSES
[email or web address]

## 2024-04-13 NOTE — ED PROVIDER NOTE - OBJECTIVE STATEMENT
64-year-old female with history of hyperlipidemia, hypothyroidism, on hormone replacement therapy for menopausal symptoms presents with complaint of left calf pain x today.  States that she has had pain to her left calf pain radiating up to left posterior thigh since this morning.  Unsure if it was related to her recent workout.  States that she was seen at urgent care today and referred to ER for venous Doppler to rule out DVT.  Denies chest pain, shortness of breath, fever, cough, trauma, numbness, history of DVT/PE, smoking or other symptoms.

## 2024-04-13 NOTE — ED ADULT NURSE NOTE - OBJECTIVE STATEMENT
patient presents to ED c/o left lower leg pain that she noticed this morning when she woke up. seen at  and sent for r/o DVT. no known hx of blood clots. not on blood thinners. takes hormone therapy. ambulating with steady gait, in NAD. denies SOB, difficulty breathing, chest pain.

## 2024-04-13 NOTE — ED PROVIDER NOTE - CARE PROVIDER_API CALL
Deondre Sloan  Orthopaedic Surgery  [address], [address]  [place] [address]  Phone: [phone]  Fax: [phone]  Follow Up Time: 1-3 Days    Gulshan Gold  Internal Medicine  [address]  [place] [address]  Phone: [phone]  Fax: [phone]  Follow Up Time: 1-3 Days

## 2024-04-13 NOTE — ED PROVIDER NOTE - PROVIDER TOKENS
PROVIDER:[TOKEN:[[id number]],FOLLOWUP:[1-3 Days]],PROVIDER:[TOKEN:[[id number]],FOLLOWUP:[1-3 Days]]

## 2024-04-13 NOTE — ED PROVIDER NOTE - MUSCULOSKELETAL, MLM
left calf/LE non tender without swelling or erythema, skin intact, no palpable cords, neg homans sign, toes warm & mobile, distal pulses and sensation intact, NVI left calf/LE non tender without swelling or erythema, skin intact, no palpable cords, neg Homans sign, toes warm & mobile,  FROM LLE, distal pulses and sensation intact, NVI

## 2024-04-16 ENCOUNTER — APPOINTMENT (OUTPATIENT)
Dept: MAMMOGRAPHY | Facility: CLINIC | Age: 64
End: 2024-04-16
Payer: MEDICARE

## 2024-04-16 ENCOUNTER — OUTPATIENT (OUTPATIENT)
Dept: OUTPATIENT SERVICES | Facility: HOSPITAL | Age: 64
LOS: 1 days | End: 2024-04-16
Payer: MEDICARE

## 2024-04-16 ENCOUNTER — RESULT REVIEW (OUTPATIENT)
Age: 64
End: 2024-04-16

## 2024-04-16 ENCOUNTER — APPOINTMENT (OUTPATIENT)
Dept: ULTRASOUND IMAGING | Facility: CLINIC | Age: 64
End: 2024-04-16
Payer: MEDICARE

## 2024-04-16 DIAGNOSIS — N60.99 UNSPECIFIED BENIGN MAMMARY DYSPLASIA OF UNSPECIFIED BREAST: ICD-10-CM

## 2024-04-16 DIAGNOSIS — Z90.49 ACQUIRED ABSENCE OF OTHER SPECIFIED PARTS OF DIGESTIVE TRACT: Chronic | ICD-10-CM

## 2024-04-16 PROCEDURE — 77067 SCR MAMMO BI INCL CAD: CPT

## 2024-04-16 PROCEDURE — 76641 ULTRASOUND BREAST COMPLETE: CPT | Mod: 26,50,GY

## 2024-04-16 PROCEDURE — 77067 SCR MAMMO BI INCL CAD: CPT | Mod: 26

## 2024-04-16 PROCEDURE — 77063 BREAST TOMOSYNTHESIS BI: CPT

## 2024-04-16 PROCEDURE — 77063 BREAST TOMOSYNTHESIS BI: CPT | Mod: 26

## 2024-04-16 PROCEDURE — 76641 ULTRASOUND BREAST COMPLETE: CPT

## 2024-04-18 ENCOUNTER — APPOINTMENT (OUTPATIENT)
Dept: SURGICAL ONCOLOGY | Facility: CLINIC | Age: 64
End: 2024-04-18
Payer: MEDICARE

## 2024-04-18 VITALS
BODY MASS INDEX: 27 KG/M2 | SYSTOLIC BLOOD PRESSURE: 136 MMHG | HEIGHT: 61 IN | DIASTOLIC BLOOD PRESSURE: 73 MMHG | WEIGHT: 143 LBS

## 2024-04-18 DIAGNOSIS — N60.99 UNSPECIFIED BENIGN MAMMARY DYSPLASIA OF UNSPECIFIED BREAST: ICD-10-CM

## 2024-04-18 PROCEDURE — 99203 OFFICE O/P NEW LOW 30 MIN: CPT

## 2024-04-18 PROCEDURE — 99213 OFFICE O/P EST LOW 20 MIN: CPT

## 2024-04-18 NOTE — HISTORY OF PRESENT ILLNESS
[de-identified] : Ms. Friedman is a 59 year old healthy woman who is formerly a patient of Dr. Lezama. She comes for yearly breast imaging and clinical exam. Her last mammo-sono was done in Feb, 2018 and demonstrated BIRADS2 findings. She denies any new breast lesions, nipple changes, or changes in the skin or contour of her breasts. She has always been told she has very dense breasts and may need an MRI, and presents to discuss her best surveillance options going forward. INTERIM 3/25/21: Ms. Friedman returns for yearly follow up clinical breast exam. She denies changes in her breasts during the last year. She has not noted any nipple inversion or discharge, or breast masses or skin changes. Her last imaging was performed in May, 2020 that demonstrated a new BIRADS 3 finding of a likely benign nodule in the left breast at 10:00, measuring 6mm.  INTERIM 4/29/21: She is here in follow up after completing annual mammo/sono on 4/7/21. The nodule in her left breast at 10:00 was noted to be stable however imaging was given a BIRADS3 with recommendation to have repeat imaging in 1 year. She offers no physical complaints today. She is going for her Bone Density after today's office visit. INTERIM 10/14/21: She is here today for 6 month follow up and breast exam. She completed a repeat breast US yesterday for follow up of her BIRADS3 mammo/sono from 4/2021 which were stable. No new changes were noted. She denies any breast skin changes, nipple discharge/bleeding or palpable masses. Overall, she is feeling well.  INTERIM 12/21/23 Ms. Friedman presents to the office for follow up  visit, last breast imagining in April 2023. BIRADs 2 benign findings. No new changes were noted. She denies any breast skin changes, nipple discharge/bleeding or palpable masses. Overall, she is feeling well. She is up to date with her GYN exams sees Dr. Georgette Mercedes.   INTERIM 4/18/24:  presents to the office for follow up visit. She had yearly breast imaging on 4/16/24. She reports feeling well. She denies any breast skin changes, nipple discharge/bleeding or palpable masses.

## 2024-10-02 NOTE — HISTORY OF PRESENT ILLNESS
[de-identified] : Ms. Friedman is a 59 year old healthy woman who is formerly a patient of Dr. Lezama. She comes for yearly breast imaging and clinical exam. Her last mammo-sono was done in Feb, 2018 and demonstrated BIRADS2 findings. She denies any new breast lesions, nipple changes, or changes in the skin or contour of her breasts. She has always been told she has very dense breasts and may need an MRI, and presents to discuss her best surveillance options going forward.\par \par INTERIM 3/25/21: Ms. Friedman returns for yearly follow up clinical breast exam. She denies changes in her breasts during the last year. She has not noted any nipple inversion or discharge, or breast masses or skin changes. Her last imaging was performed in May, 2020 that demonstrated a new BIRADS 3 finding of a likely benign nodule in the left breast at 10:00, measuring 6mm. \par \par INTERIM 4/29/21: She is here in follow up after completing annual mammo/sono on 4/7/21. The nodule in her left breast at 10:00 was noted to be stable however imaging was given a BIRADS3 with recommendation to have repeat imaging in 1 year. She offers no physical complaints today. She is going for her Bone Density after today's office visit.

## 2025-01-29 NOTE — ED PROVIDER NOTE - FAMILY HISTORY
epicondyles, olecranon, or radial head. Elbow and forearm strength is 5/5.      Radiology:     X-rays obtained and reviewed in office:  Views AP lateral and radial head view  Location right elbow  Impression.  There is no evidence of obvious degenerative changes or elbow joint effusion.  No epicondylar calcification           Assessment : 1.  2+ months status post gradual worsening right anterior lateral elbow pain with suspected overuse brachial radialis strain with lateral epicondylitis     Impression:       Encounter Diagnoses   Name Primary?    Right elbow pain Yes    Right lateral epicondylitis      Strain of right forearm, initial encounter           Office Procedures:        Orders Placed This Encounter   Procedures    Breg Tennis Elbow Strap $20       Patient was supplied a Breg Tennis Elbow Strap.  This retail item was supplied to provide functional support and assist in protecting the affected area.       Verbal and written instructions for the use of and application of this item were provided.  The patient was educated and fit by a healthcare professional with expert knowledge and specialization in brace application.  They were instructed to contact the office immediately should the equipment result in increased pain, decreased sensation, increased swelling or worsening of the condition.    XR ELBOW RIGHT (MIN 3 VIEWS)       Standing Status:   Future       Number of Occurrences:   1       Standing Expiration Date:   1/28/2026    Ambulatory referral to Physical Therapy       Referral Priority:   Routine       Referral Type:   Eval and Treat       Referral Reason:   Specialty Services Required       Referred to Provider:   Sreedhar Aguilar, PT       Requested Specialty:   Physical Therapy       Number of Visits Requested:   1    Manuela Guillermo Titan Wrist Short Brace       Patient was prescribed a Manuela Guillermo Titan Wrist Orthosis.   The right wrist will require stabilization / immobilization from this semi-rigid  No pertinent family history in first degree relatives

## 2025-04-14 ENCOUNTER — NON-APPOINTMENT (OUTPATIENT)
Age: 65
End: 2025-04-14

## 2025-04-14 ENCOUNTER — APPOINTMENT (OUTPATIENT)
Dept: SURGICAL ONCOLOGY | Facility: CLINIC | Age: 65
End: 2025-04-14
Payer: MEDICARE

## 2025-04-14 VITALS
RESPIRATION RATE: 16 BRPM | HEART RATE: 72 BPM | BODY MASS INDEX: 27.23 KG/M2 | SYSTOLIC BLOOD PRESSURE: 155 MMHG | OXYGEN SATURATION: 98 % | WEIGHT: 144.2 LBS | HEIGHT: 61 IN | DIASTOLIC BLOOD PRESSURE: 82 MMHG

## 2025-04-14 DIAGNOSIS — N60.99 UNSPECIFIED BENIGN MAMMARY DYSPLASIA OF UNSPECIFIED BREAST: ICD-10-CM

## 2025-04-14 PROCEDURE — 99205 OFFICE O/P NEW HI 60 MIN: CPT

## 2025-04-17 ENCOUNTER — APPOINTMENT (OUTPATIENT)
Dept: SURGICAL ONCOLOGY | Facility: CLINIC | Age: 65
End: 2025-04-17

## 2025-06-10 ENCOUNTER — RESULT REVIEW (OUTPATIENT)
Age: 65
End: 2025-06-10

## 2025-06-10 ENCOUNTER — APPOINTMENT (OUTPATIENT)
Dept: ULTRASOUND IMAGING | Facility: CLINIC | Age: 65
End: 2025-06-10

## 2025-06-10 ENCOUNTER — APPOINTMENT (OUTPATIENT)
Dept: MAMMOGRAPHY | Facility: CLINIC | Age: 65
End: 2025-06-10

## 2025-06-10 ENCOUNTER — OUTPATIENT (OUTPATIENT)
Dept: OUTPATIENT SERVICES | Facility: HOSPITAL | Age: 65
LOS: 1 days | End: 2025-06-10
Payer: MEDICARE

## 2025-06-10 DIAGNOSIS — N60.99 UNSPECIFIED BENIGN MAMMARY DYSPLASIA OF UNSPECIFIED BREAST: ICD-10-CM

## 2025-06-10 DIAGNOSIS — Z90.49 ACQUIRED ABSENCE OF OTHER SPECIFIED PARTS OF DIGESTIVE TRACT: Chronic | ICD-10-CM

## 2025-06-10 DIAGNOSIS — R92.8 OTHER ABNORMAL AND INCONCLUSIVE FINDINGS ON DIAGNOSTIC IMAGING OF BREAST: ICD-10-CM

## 2025-06-10 PROCEDURE — 77067 SCR MAMMO BI INCL CAD: CPT | Mod: 26

## 2025-06-10 PROCEDURE — 76641 ULTRASOUND BREAST COMPLETE: CPT

## 2025-06-10 PROCEDURE — 77063 BREAST TOMOSYNTHESIS BI: CPT | Mod: 26

## 2025-06-10 PROCEDURE — 77063 BREAST TOMOSYNTHESIS BI: CPT

## 2025-06-10 PROCEDURE — 76641 ULTRASOUND BREAST COMPLETE: CPT | Mod: 26,50,GA

## 2025-06-10 PROCEDURE — 77067 SCR MAMMO BI INCL CAD: CPT
